# Patient Record
Sex: MALE | Race: WHITE | NOT HISPANIC OR LATINO | Employment: OTHER | URBAN - METROPOLITAN AREA
[De-identification: names, ages, dates, MRNs, and addresses within clinical notes are randomized per-mention and may not be internally consistent; named-entity substitution may affect disease eponyms.]

---

## 2017-12-13 ENCOUNTER — TRANSCRIBE ORDERS (OUTPATIENT)
Dept: ADMINISTRATIVE | Facility: HOSPITAL | Age: 72
End: 2017-12-13

## 2017-12-13 DIAGNOSIS — R06.02 SHORTNESS OF BREATH: Primary | ICD-10-CM

## 2017-12-14 ENCOUNTER — HOSPITAL ENCOUNTER (INPATIENT)
Facility: HOSPITAL | Age: 72
LOS: 5 days | Discharge: HOME | End: 2017-12-19
Attending: EMERGENCY MEDICINE | Admitting: INTERNAL MEDICINE

## 2017-12-14 ENCOUNTER — APPOINTMENT (OUTPATIENT)
Dept: CT IMAGING | Facility: HOSPITAL | Age: 72
End: 2017-12-14

## 2017-12-14 DIAGNOSIS — R11.2 NON-INTRACTABLE VOMITING WITH NAUSEA, UNSPECIFIED VOMITING TYPE: ICD-10-CM

## 2017-12-14 DIAGNOSIS — K56.609 SBO (SMALL BOWEL OBSTRUCTION) (HCC): Primary | ICD-10-CM

## 2017-12-14 DIAGNOSIS — R10.33 PERIUMBILICAL ABDOMINAL PAIN: ICD-10-CM

## 2017-12-14 PROBLEM — E87.6 HYPOKALEMIA: Status: ACTIVE | Noted: 2017-12-14

## 2017-12-14 PROBLEM — Z85.46 H/O PROSTATE CANCER: Status: ACTIVE | Noted: 2017-12-14

## 2017-12-14 PROBLEM — I10 HTN (HYPERTENSION): Status: ACTIVE | Noted: 2017-12-14

## 2017-12-14 LAB
ALBUMIN SERPL-MCNC: 4.1 G/DL (ref 3.2–4.8)
ALBUMIN/GLOB SERPL: 1.4 G/DL (ref 1.5–2.5)
ALP SERPL-CCNC: 81 U/L (ref 25–100)
ALT SERPL W P-5'-P-CCNC: 24 U/L (ref 7–40)
ANION GAP SERPL CALCULATED.3IONS-SCNC: 11 MMOL/L (ref 3–11)
AST SERPL-CCNC: 27 U/L (ref 0–33)
BACTERIA UR QL AUTO: ABNORMAL /HPF
BASOPHILS # BLD AUTO: 0.02 10*3/MM3 (ref 0–0.2)
BASOPHILS NFR BLD AUTO: 0.2 % (ref 0–1)
BILIRUB SERPL-MCNC: 1.3 MG/DL (ref 0.3–1.2)
BILIRUB UR QL STRIP: NEGATIVE
BUN BLD-MCNC: 19 MG/DL (ref 9–23)
BUN/CREAT SERPL: 23.8 (ref 7–25)
CALCIUM SPEC-SCNC: 9.1 MG/DL (ref 8.7–10.4)
CHLORIDE SERPL-SCNC: 102 MMOL/L (ref 99–109)
CLARITY UR: CLEAR
CO2 SERPL-SCNC: 25 MMOL/L (ref 20–31)
COLOR UR: YELLOW
CREAT BLD-MCNC: 0.8 MG/DL (ref 0.6–1.3)
DEPRECATED RDW RBC AUTO: 43.8 FL (ref 37–54)
EOSINOPHIL # BLD AUTO: 0.08 10*3/MM3 (ref 0–0.3)
EOSINOPHIL NFR BLD AUTO: 0.8 % (ref 0–3)
ERYTHROCYTE [DISTWIDTH] IN BLOOD BY AUTOMATED COUNT: 13.2 % (ref 11.3–14.5)
GFR SERPL CREATININE-BSD FRML MDRD: 95 ML/MIN/1.73
GLOBULIN UR ELPH-MCNC: 3 GM/DL
GLUCOSE BLD-MCNC: 126 MG/DL (ref 70–100)
GLUCOSE UR STRIP-MCNC: NEGATIVE MG/DL
HCT VFR BLD AUTO: 48.5 % (ref 38.9–50.9)
HGB BLD-MCNC: 16.4 G/DL (ref 13.1–17.5)
HGB UR QL STRIP.AUTO: ABNORMAL
HOLD SPECIMEN: NORMAL
HOLD SPECIMEN: NORMAL
HYALINE CASTS UR QL AUTO: ABNORMAL /LPF
IMM GRANULOCYTES # BLD: 0.01 10*3/MM3 (ref 0–0.03)
IMM GRANULOCYTES NFR BLD: 0.1 % (ref 0–0.6)
KETONES UR QL STRIP: ABNORMAL
LEUKOCYTE ESTERASE UR QL STRIP.AUTO: NEGATIVE
LIPASE SERPL-CCNC: 28 U/L (ref 6–51)
LYMPHOCYTES # BLD AUTO: 2.2 10*3/MM3 (ref 0.6–4.8)
LYMPHOCYTES NFR BLD AUTO: 21.6 % (ref 24–44)
MCH RBC QN AUTO: 31 PG (ref 27–31)
MCHC RBC AUTO-ENTMCNC: 33.8 G/DL (ref 32–36)
MCV RBC AUTO: 91.7 FL (ref 80–99)
MONOCYTES # BLD AUTO: 1.02 10*3/MM3 (ref 0–1)
MONOCYTES NFR BLD AUTO: 10 % (ref 0–12)
NEUTROPHILS # BLD AUTO: 6.84 10*3/MM3 (ref 1.5–8.3)
NEUTROPHILS NFR BLD AUTO: 67.3 % (ref 41–71)
NITRITE UR QL STRIP: NEGATIVE
PH UR STRIP.AUTO: 5.5 [PH] (ref 5–8)
PLATELET # BLD AUTO: 154 10*3/MM3 (ref 150–450)
PMV BLD AUTO: 10 FL (ref 6–12)
POTASSIUM BLD-SCNC: 3.3 MMOL/L (ref 3.5–5.5)
PROT SERPL-MCNC: 7.1 G/DL (ref 5.7–8.2)
PROT UR QL STRIP: NEGATIVE
RBC # BLD AUTO: 5.29 10*6/MM3 (ref 4.2–5.76)
RBC # UR: ABNORMAL /HPF
REF LAB TEST METHOD: ABNORMAL
SODIUM BLD-SCNC: 138 MMOL/L (ref 132–146)
SP GR UR STRIP: 1.05 (ref 1–1.03)
SQUAMOUS #/AREA URNS HPF: ABNORMAL /HPF
UROBILINOGEN UR QL STRIP: ABNORMAL
WBC NRBC COR # BLD: 10.17 10*3/MM3 (ref 3.5–10.8)
WBC UR QL AUTO: ABNORMAL /HPF
WHOLE BLOOD HOLD SPECIMEN: NORMAL
WHOLE BLOOD HOLD SPECIMEN: NORMAL

## 2017-12-14 PROCEDURE — 83690 ASSAY OF LIPASE: CPT | Performed by: EMERGENCY MEDICINE

## 2017-12-14 PROCEDURE — 0 IOPAMIDOL 61 % SOLUTION: Performed by: EMERGENCY MEDICINE

## 2017-12-14 PROCEDURE — 81001 URINALYSIS AUTO W/SCOPE: CPT | Performed by: EMERGENCY MEDICINE

## 2017-12-14 PROCEDURE — 25010000002 KETOROLAC TROMETHAMINE PER 15 MG: Performed by: EMERGENCY MEDICINE

## 2017-12-14 PROCEDURE — 99284 EMERGENCY DEPT VISIT MOD MDM: CPT

## 2017-12-14 PROCEDURE — 74177 CT ABD & PELVIS W/CONTRAST: CPT

## 2017-12-14 PROCEDURE — 25010000002 DICYCLOMINE PER 20 MG: Performed by: EMERGENCY MEDICINE

## 2017-12-14 PROCEDURE — 99223 1ST HOSP IP/OBS HIGH 75: CPT | Performed by: INTERNAL MEDICINE

## 2017-12-14 PROCEDURE — 25010000002 ONDANSETRON PER 1 MG: Performed by: EMERGENCY MEDICINE

## 2017-12-14 PROCEDURE — 85025 COMPLETE CBC W/AUTO DIFF WBC: CPT | Performed by: EMERGENCY MEDICINE

## 2017-12-14 PROCEDURE — 80053 COMPREHEN METABOLIC PANEL: CPT | Performed by: EMERGENCY MEDICINE

## 2017-12-14 RX ORDER — FAMOTIDINE 10 MG/ML
20 INJECTION, SOLUTION INTRAVENOUS ONCE
Status: COMPLETED | OUTPATIENT
Start: 2017-12-14 | End: 2017-12-14

## 2017-12-14 RX ORDER — ATENOLOL 100 MG/1
TABLET ORAL NIGHTLY
COMMUNITY

## 2017-12-14 RX ORDER — KETOROLAC TROMETHAMINE 15 MG/ML
7.5 INJECTION, SOLUTION INTRAMUSCULAR; INTRAVENOUS ONCE
Status: COMPLETED | OUTPATIENT
Start: 2017-12-14 | End: 2017-12-14

## 2017-12-14 RX ORDER — PANTOPRAZOLE SODIUM 20 MG/1
TABLET, DELAYED RELEASE ORAL DAILY
COMMUNITY

## 2017-12-14 RX ORDER — SODIUM CHLORIDE 9 MG/ML
125 INJECTION, SOLUTION INTRAVENOUS CONTINUOUS
Status: DISCONTINUED | OUTPATIENT
Start: 2017-12-14 | End: 2017-12-15

## 2017-12-14 RX ORDER — ONDANSETRON 2 MG/ML
4 INJECTION INTRAMUSCULAR; INTRAVENOUS ONCE
Status: COMPLETED | OUTPATIENT
Start: 2017-12-14 | End: 2017-12-14

## 2017-12-14 RX ORDER — SODIUM CHLORIDE 0.9 % (FLUSH) 0.9 %
10 SYRINGE (ML) INJECTION AS NEEDED
Status: DISCONTINUED | OUTPATIENT
Start: 2017-12-14 | End: 2017-12-15

## 2017-12-14 RX ORDER — BUPROPION HYDROCHLORIDE 75 MG/1
TABLET ORAL NIGHTLY
COMMUNITY

## 2017-12-14 RX ORDER — DICYCLOMINE HYDROCHLORIDE 10 MG/ML
20 INJECTION INTRAMUSCULAR ONCE
Status: COMPLETED | OUTPATIENT
Start: 2017-12-14 | End: 2017-12-14

## 2017-12-14 RX ORDER — HYDROCHLOROTHIAZIDE 12.5 MG/1
CAPSULE, GELATIN COATED ORAL DAILY
COMMUNITY
End: 2018-08-29 | Stop reason: DRUGHIGH

## 2017-12-14 RX ORDER — LISINOPRIL 10 MG/1
TABLET ORAL NIGHTLY
COMMUNITY

## 2017-12-14 RX ADMIN — SODIUM CHLORIDE 125 ML/HR: 9 INJECTION, SOLUTION INTRAVENOUS at 22:41

## 2017-12-14 RX ADMIN — IOPAMIDOL 99 ML: 612 INJECTION, SOLUTION INTRAVENOUS at 22:07

## 2017-12-14 RX ADMIN — FAMOTIDINE 20 MG: 10 INJECTION, SOLUTION INTRAVENOUS at 21:30

## 2017-12-14 RX ADMIN — ONDANSETRON 4 MG: 2 INJECTION INTRAMUSCULAR; INTRAVENOUS at 21:27

## 2017-12-14 RX ADMIN — KETOROLAC TROMETHAMINE 7.5 MG: 15 INJECTION, SOLUTION INTRAMUSCULAR; INTRAVENOUS at 21:36

## 2017-12-14 RX ADMIN — SODIUM CHLORIDE 1000 ML: 9 INJECTION, SOLUTION INTRAVENOUS at 21:27

## 2017-12-14 RX ADMIN — DICYCLOMINE HYDROCHLORIDE 20 MG: 20 INJECTION, SOLUTION INTRAMUSCULAR at 21:38

## 2017-12-15 LAB
ANION GAP SERPL CALCULATED.3IONS-SCNC: 13 MMOL/L (ref 3–11)
BUN BLD-MCNC: 19 MG/DL (ref 9–23)
BUN/CREAT SERPL: 23.8 (ref 7–25)
CALCIUM SPEC-SCNC: 8.1 MG/DL (ref 8.7–10.4)
CHLORIDE SERPL-SCNC: 106 MMOL/L (ref 99–109)
CO2 SERPL-SCNC: 21 MMOL/L (ref 20–31)
CREAT BLD-MCNC: 0.8 MG/DL (ref 0.6–1.3)
D-LACTATE SERPL-SCNC: 0.6 MMOL/L (ref 0.5–2)
GFR SERPL CREATININE-BSD FRML MDRD: 95 ML/MIN/1.73
GLUCOSE BLD-MCNC: 111 MG/DL (ref 70–100)
MAGNESIUM SERPL-MCNC: 1.9 MG/DL (ref 1.3–2.7)
POTASSIUM BLD-SCNC: 3.6 MMOL/L (ref 3.5–5.5)
SODIUM BLD-SCNC: 140 MMOL/L (ref 132–146)

## 2017-12-15 PROCEDURE — 25010000003 POTASSIUM CHLORIDE 10 MEQ/100ML SOLUTION: Performed by: INTERNAL MEDICINE

## 2017-12-15 PROCEDURE — 25010000002 LORAZEPAM PER 2 MG: Performed by: INTERNAL MEDICINE

## 2017-12-15 PROCEDURE — 25010000002 MORPHINE SULFATE (PF) 2 MG/ML SOLUTION: Performed by: INTERNAL MEDICINE

## 2017-12-15 PROCEDURE — 83605 ASSAY OF LACTIC ACID: CPT | Performed by: INTERNAL MEDICINE

## 2017-12-15 PROCEDURE — 80048 BASIC METABOLIC PNL TOTAL CA: CPT | Performed by: INTERNAL MEDICINE

## 2017-12-15 PROCEDURE — 99233 SBSQ HOSP IP/OBS HIGH 50: CPT | Performed by: HOSPITALIST

## 2017-12-15 PROCEDURE — 25010000002 ONDANSETRON PER 1 MG: Performed by: INTERNAL MEDICINE

## 2017-12-15 PROCEDURE — 83735 ASSAY OF MAGNESIUM: CPT | Performed by: INTERNAL MEDICINE

## 2017-12-15 RX ORDER — MORPHINE SULFATE 2 MG/ML
1 INJECTION, SOLUTION INTRAMUSCULAR; INTRAVENOUS EVERY 4 HOURS PRN
Status: DISCONTINUED | OUTPATIENT
Start: 2017-12-15 | End: 2017-12-19 | Stop reason: HOSPADM

## 2017-12-15 RX ORDER — NALOXONE HCL 0.4 MG/ML
0.4 VIAL (ML) INJECTION
Status: DISCONTINUED | OUTPATIENT
Start: 2017-12-15 | End: 2017-12-19 | Stop reason: HOSPADM

## 2017-12-15 RX ORDER — MAGNESIUM SULFATE HEPTAHYDRATE 40 MG/ML
2 INJECTION, SOLUTION INTRAVENOUS AS NEEDED
Status: DISCONTINUED | OUTPATIENT
Start: 2017-12-15 | End: 2017-12-19 | Stop reason: HOSPADM

## 2017-12-15 RX ORDER — HYDRALAZINE HYDROCHLORIDE 20 MG/ML
10 INJECTION INTRAMUSCULAR; INTRAVENOUS EVERY 6 HOURS PRN
Status: DISCONTINUED | OUTPATIENT
Start: 2017-12-15 | End: 2017-12-19 | Stop reason: HOSPADM

## 2017-12-15 RX ORDER — SODIUM CHLORIDE 9 MG/ML
150 INJECTION, SOLUTION INTRAVENOUS CONTINUOUS
Status: DISCONTINUED | OUTPATIENT
Start: 2017-12-15 | End: 2017-12-17

## 2017-12-15 RX ORDER — BUPROPION HYDROCHLORIDE 75 MG/1
75 TABLET ORAL NIGHTLY
Status: DISCONTINUED | OUTPATIENT
Start: 2017-12-15 | End: 2017-12-19 | Stop reason: HOSPADM

## 2017-12-15 RX ORDER — SODIUM CHLORIDE 0.9 % (FLUSH) 0.9 %
1-10 SYRINGE (ML) INJECTION AS NEEDED
Status: DISCONTINUED | OUTPATIENT
Start: 2017-12-15 | End: 2017-12-15

## 2017-12-15 RX ORDER — PANTOPRAZOLE SODIUM 40 MG/1
40 TABLET, DELAYED RELEASE ORAL
Status: DISCONTINUED | OUTPATIENT
Start: 2017-12-16 | End: 2017-12-19 | Stop reason: HOSPADM

## 2017-12-15 RX ORDER — ONDANSETRON 2 MG/ML
4 INJECTION INTRAMUSCULAR; INTRAVENOUS EVERY 6 HOURS PRN
Status: DISCONTINUED | OUTPATIENT
Start: 2017-12-15 | End: 2017-12-19 | Stop reason: HOSPADM

## 2017-12-15 RX ORDER — POTASSIUM CHLORIDE 7.45 MG/ML
10 INJECTION INTRAVENOUS
Status: DISCONTINUED | OUTPATIENT
Start: 2017-12-15 | End: 2017-12-19 | Stop reason: HOSPADM

## 2017-12-15 RX ORDER — OXYCODONE HYDROCHLORIDE 5 MG/1
5 TABLET ORAL EVERY 4 HOURS PRN
Status: DISCONTINUED | OUTPATIENT
Start: 2017-12-15 | End: 2017-12-19 | Stop reason: HOSPADM

## 2017-12-15 RX ORDER — POTASSIUM CHLORIDE 750 MG/1
40 CAPSULE, EXTENDED RELEASE ORAL AS NEEDED
Status: DISCONTINUED | OUTPATIENT
Start: 2017-12-15 | End: 2017-12-18 | Stop reason: SDUPTHER

## 2017-12-15 RX ORDER — MAGNESIUM SULFATE HEPTAHYDRATE 40 MG/ML
4 INJECTION, SOLUTION INTRAVENOUS AS NEEDED
Status: DISCONTINUED | OUTPATIENT
Start: 2017-12-15 | End: 2017-12-19 | Stop reason: HOSPADM

## 2017-12-15 RX ORDER — LORAZEPAM 2 MG/ML
0.5 INJECTION INTRAMUSCULAR ONCE
Status: COMPLETED | OUTPATIENT
Start: 2017-12-15 | End: 2017-12-15

## 2017-12-15 RX ADMIN — MORPHINE SULFATE 1 MG: 25 INJECTION, SOLUTION, CONCENTRATE INTRAVENOUS at 09:27

## 2017-12-15 RX ADMIN — LORAZEPAM 0.5 MG: 2 INJECTION INTRAMUSCULAR; INTRAVENOUS at 00:55

## 2017-12-15 RX ADMIN — BUPROPION HYDROCHLORIDE 75 MG: 75 TABLET, FILM COATED ORAL at 21:20

## 2017-12-15 RX ADMIN — SODIUM CHLORIDE 150 ML/HR: 9 INJECTION, SOLUTION INTRAVENOUS at 05:33

## 2017-12-15 RX ADMIN — POTASSIUM CHLORIDE 10 MEQ: 7.46 INJECTION, SOLUTION INTRAVENOUS at 04:06

## 2017-12-15 RX ADMIN — POTASSIUM CHLORIDE 10 MEQ: 7.46 INJECTION, SOLUTION INTRAVENOUS at 02:29

## 2017-12-15 RX ADMIN — SODIUM CHLORIDE 150 ML/HR: 9 INJECTION, SOLUTION INTRAVENOUS at 19:42

## 2017-12-15 RX ADMIN — ONDANSETRON 4 MG: 2 INJECTION INTRAMUSCULAR; INTRAVENOUS at 09:27

## 2017-12-15 RX ADMIN — POTASSIUM CHLORIDE 10 MEQ: 7.46 INJECTION, SOLUTION INTRAVENOUS at 00:27

## 2017-12-15 RX ADMIN — POTASSIUM CHLORIDE 10 MEQ: 7.46 INJECTION, SOLUTION INTRAVENOUS at 01:25

## 2017-12-15 NOTE — PROGRESS NOTES
Discharge Planning Assessment  [facility]     Patient Name: Hayden Farah  MRN: [id number]  Today's Date: 12/15/2017    Admit Date: 12/14/2017          Discharge Needs Assessment       12/15/17 1501    Living Environment    Lives With spouse    Living Arrangements house   3 level home    Home Accessibility bed and bath on same level    Number of Stairs to Enter Home 1    Number of Stairs Within Home 15    Stair Railings at Home inside, present on right side    Type of Financial/Environmental Concern none    Transportation Available family or friend will provide;car    Living Environment    Provides Primary Care For no one    Primary Care Provided By spouse/significant other    Quality Of Family Relationships supportive    Able to Return to Prior Living Arrangements yes    Discharge Needs Assessment    Concerns To Be Addressed no discharge needs identified    Readmission Within The Last 30 Days no previous admission in last 30 days    Anticipated Changes Related to Illness none    Equipment Currently Used at Home none    Equipment Needed After Discharge none    Discharge Disposition home or self-care    Discharge Contact Information if Applicable [phone]            Discharge Plan       12/15/17 1502    Case Management/Social Work Plan    Plan Home    Patient/Family In Agreement With Plan yes    Additional Comments I spoke with patient this afternoon. No current discharge planning needs identified.         Discharge Placement     No information found        Expected Discharge Date and Time     Expected Discharge Date Expected Discharge Time    Dec 17, 2017               Demographic Summary       12/15/17 1500    Referral Information    Admission Type inpatient    Referral Source admission list    Record Reviewed medical record    Contact Information    Permission Granted to Share Information With     Primary Care Physician Information    Name Bean Jaen            Functional Status        12/15/17 1500    Functional Status Current    Ambulation 2-->assistive person    Transferring 0-->independent    Toileting 0-->independent    Bathing 0-->independent    Dressing 0-->independent    Eating 0-->independent    Communication 0-->understands/communicates without difficulty    Change in Functional Status Since Onset of Current Illness/Injury no    Functional Status Prior    Ambulation 0-->independent    Transferring 0-->independent    Toileting 0-->independent    Bathing 0-->independent    Dressing 0-->independent    Eating 0-->independent    Communication 0-->understands/communicates without difficulty    IADL    Medications independent    Meal Preparation independent    Housekeeping independent    Laundry independent    Shopping independent    Oral Care independent    Activity Tolerance    Current Activity Limitations none    Usual Activity Tolerance excellent    Current Activity Tolerance excellent    Employment/Financial    Employment/Finance Comments Has Medicare and supplement            Psychosocial     None            Abuse/Neglect     None            Legal     None            Substance Abuse     None            Patient Forms     None          Charmaine Morales RN

## 2017-12-15 NOTE — PLAN OF CARE
Problem: Bowel Obstruction (Adult)  Goal: Signs and Symptoms of Listed Potential Problems Will be Absent or Manageable (Bowel Obstruction)  Outcome: Ongoing (interventions implemented as appropriate)

## 2017-12-15 NOTE — PROGRESS NOTES
[facility] Medicine Services  PROGRESS NOTE    Patient Name: Hayden Farah  : 1945  MRN: [id number]    Date of Admission: 2017  Length of Stay: 1  Primary Care Physician: Bean Jane MD    Subjective   Subjective     CC:  FU SBO    HPI:  Abdominal pain better. +Flatus and had a small BM. No appetite yet. Ambulating on his own.    Review of Systems  Gen- No fevers, chills  CV- No chest pain, palpitations  Resp- No cough, dyspnea  GI- No N/V/D, abd pain    Otherwise ROS is negative except as mentioned in the HPI.    Objective   Objective     Vital Signs:   Temp:  [97.9 °F (36.6 °C)-99.3 °F (37.4 °C)] 97.9 °F (36.6 °C)  Heart Rate:  [61-86] 86  Resp:  [16-18] 16  BP: (137-160)/(76-91) 138/76        Physical Exam:  Constitutional: No acute distress, awake, alert on RA  Eyes: PERRLA, sclerae anicteric, no conjunctival injection  HENT: NCAT, mucous membranes moist  Neck: Supple, no thyromegaly, no lymphadenopathy, trachea midline  Respiratory: Clear to auscultation bilaterally, nonlabored respirations   Cardiovascular: RRR, no murmurs, rubs, or gallops, palpable pedal pulses bilaterally  Gastrointestinal: Positive bowel sounds, soft, mild tenderness to palpation in RLQ and periumbilical regions, nondistended  Musculoskeletal: No bilateral ankle edema, no clubbing or cyanosis to extremities  Psychiatric: Appropriate affect, cooperative  Neurologic: Oriented x 3, strength symmetric in all extremities, Cranial Nerves grossly intact to confrontation, speech clear  Skin: No rashes    Results Reviewed:  I have personally reviewed current lab, radiology, and data and agree.      Results from last 7 days  Lab Units 17  2119   WBC 10*3/mm3 10.17   HEMOGLOBIN g/dL 16.4   HEMATOCRIT % 48.5   PLATELETS 10*3/mm3 154       Results from last 7 days  Lab Units 12/15/17  0308 17  2119   SODIUM mmol/L 140 138   POTASSIUM mmol/L 3.6 3.3*   CHLORIDE mmol/L 106 102   CO2  mmol/L 21.0 25.0   BUN mg/dL 19 19   CREATININE mg/dL 0.80 0.80   GLUCOSE mg/dL 111* 126*   CALCIUM mg/dL 8.1* 9.1   ALT (SGPT) U/L  --  24   AST (SGOT) U/L  --  27     No results found for: BNP  No results found for: PHART    Microbiology Results Abnormal     None          Imaging Results (last 24 hours)     Procedure Component Value Units Date/Time    CT Abdomen Pelvis With Contrast [[id number]] Collected:  12/14/17 2115     Updated:  12/14/17 2250    Narrative:       EXAM:  CT Abdomen and Pelvis With Intravenous Contrast    CLINICAL HISTORY:  72 years old, male; Pain; Abdominal pain; Localized; Right upper quadrant   (ruq); Prior surgery; Surgery date: 6+ months; Surgery type: Nicole; Additional   info: Abdominal pain with n/v and borderline fever    TECHNIQUE:  Axial computed tomography images of the abdomen and pelvis with intravenous   contrast.  All CT scans at this facility use one or more dose reduction   techniques, viz.: automated exposure control; ma/kV adjustment per patient size   (including targeted exams where dose is matched to indication; i.e. head); or   iterative reconstruction technique.  Coronal reformatted images were created and reviewed.    CONTRAST:  99 mL of isovue 300 administered intravenously.    COMPARISON:  CR - OX HIP AP PELVIS AN 1 LATERAL PNL 2014-05-01 17:28    FINDINGS:  Lower thorax:  Granuloma within the lingula.    ABDOMEN:  Liver:  Normal.  Gallbladder and bile ducts:  Gallbladder is surgically absent.  Pancreas:  Normal.  Spleen:  Splenic calcifications, compatible with prior granulomatous disease.    Splenomegaly.  Adrenals:  Normal.  Kidneys and ureters:  Bilateral simple renal cysts.  Stomach and bowel:  Multiple loops of mildly dilated, gas and fluid-filled mid   and distal small bowel, with apparent transition to normal caliber small bowel   within the right lower quadrant.  Colonic diverticulosis.  Appendix:  The appendix is normal.    PELVIS:  Bladder:   Normal.  Reproductive:  Normal as visualized.    ABDOMEN and PELVIS:  Intraperitoneal space:  Normal.  No free air.  No significant fluid collection.  Bones/joints:  Degenerative changes of the hips and sacroiliac joints.    Multilevel thoracolumbar spine degenerative changes.  No acute fracture.  No   dislocation.  Soft tissues:  Small fat-containing umbilical hernia.  Small fat-containing   left inguinal hernia, without acute complications.  Vasculature:  Normal.  No abdominal aortic aneurysm.  Lymph nodes:  Normal.      Impression:         1.  Multiple loops of mildly dilated, gas and fluid-filled mid and distal small   bowel, with apparent transition to normal caliber small bowel within the right   lower quadrant.  Findings most compatible with small bowel obstruction.    2.  Incidental/non-acute findings are described above.    THIS DOCUMENT HAS BEEN ELECTRONICALLY SIGNED BY NIKKY TAN MD             I have reviewed the medications.    Assessment/Plan   Assessment / Plan     Hospital Problem List     Hypokalemia    SBO (small bowel obstruction)    HTN (hypertension)    H/O prostate cancer        Assessment & Plan:  - SBO: CT A/P reviewed. Appears to be improving with +flatus and BM. NGT to clamp. PO meds okay. Pain control with Oxycodone PRN and IV Morphine PRN.  - HypoK+: Replete. Mag wnl  - HTN: Holding home atenolol and lisinopril for normotension. Restart when appropriate    DVT Prophylaxis:  Mechanical, ambulation    CODE STATUS: Full Code    Disposition: I expect the patient to be discharged home in 1-2 days  Tanika Moreno MD  12/15/17  7:15 AM

## 2017-12-15 NOTE — H&P
"    [facility] Medicine Services  HISTORY AND PHYSICAL    Patient Name: Hayden Farah  : 1945  MRN: [id number]  Primary Care Physician: Bean Jane MD    Subjective   Subjective     Chief Complaint:  n/v    HPI:  Hayden Farah is a 72 y.o. male who presents at the insistence of several family members after 3 day hx of rlq abd pain a/w n/v/anorexia and 1 day of fevers 100+.  Pt states he has not had a normal bm in few days and is now having \"diarrhea\" once a day.  Pt has had very little po intake secondary to all the above.  No prior hx of sbo.  Has colonoscopies q5yrs and is currently due (has hx of polyps).  Has had ccy/prostatectomy.    Review of Systems      Otherwise 10-system ROS reviewed and is negative except as mentioned in the HPI.    Personal History     Past Medical History:   Diagnosis Date   • Cancer     prostate cancer   • Celiac disease    • Headache    • Hypertension    • Pneumonia     Pt reports he was 32, in hospital for weeks.        Past Surgical History:   Procedure Laterality Date   • CHOLECYSTECTOMY     • PROSTATECTOMY     • SHOULDER SURGERY Left 20 years ago       Family History: family history is not on file.     Social History:  reports that he has never smoked. He has never used smokeless tobacco. He reports that he does not drink alcohol or use illicit drugs.    Medications:    (Not in a hospital admission)    Allergies   Allergen Reactions   • Sulfa Antibiotics        Objective   Objective     Vital Signs:   Temp:  [99.3 °F (37.4 °C)] 99.3 °F (37.4 °C)  Heart Rate:  [61] 61  Resp:  [18] 18  BP: (137-144)/(76-79) 144/76        Physical Exam    gen; alert, oriented, nad  Heent; perrla, eomi, pasty mm  Cv; rrr, no mrg  L; ctab, no wheeze/crackles  Abd; soft, ttp diffusely but greatest in llq,rlq, no r; sl guarding  Ext; no cce, 2+ pulses  Skin; cdi, warm  Neuro; grossly intact  Psych; mood and affect appropriate    Results Reviewed:  I have " personally reviewed current lab, radiology, and data and agree.      Results from last 7 days  Lab Units 12/14/17 2119   WBC 10*3/mm3 10.17   HEMOGLOBIN g/dL 16.4   HEMATOCRIT % 48.5   PLATELETS 10*3/mm3 154       Results from last 7 days  Lab Units 12/14/17 2119   SODIUM mmol/L 138   POTASSIUM mmol/L 3.3*   CHLORIDE mmol/L 102   CO2 mmol/L 25.0   BUN mg/dL 19   CREATININE mg/dL 0.80   GLUCOSE mg/dL 126*   CALCIUM mg/dL 9.1   ALT (SGPT) U/L 24   AST (SGOT) U/L 27     No results found for: BNP  No results found for: PHART  Imaging Results (last 24 hours)     Procedure Component Value Units Date/Time    CT Abdomen Pelvis With Contrast [[id number]] Collected:  12/14/17 2115     Updated:  12/14/17 2250    Narrative:       EXAM:  CT Abdomen and Pelvis With Intravenous Contrast    CLINICAL HISTORY:  72 years old, male; Pain; Abdominal pain; Localized; Right upper quadrant   (ruq); Prior surgery; Surgery date: 6+ months; Surgery type: Nicole; Additional   info: Abdominal pain with n/v and borderline fever    TECHNIQUE:  Axial computed tomography images of the abdomen and pelvis with intravenous   contrast.  All CT scans at this facility use one or more dose reduction   techniques, viz.: automated exposure control; ma/kV adjustment per patient size   (including targeted exams where dose is matched to indication; i.e. head); or   iterative reconstruction technique.  Coronal reformatted images were created and reviewed.    CONTRAST:  99 mL of isovue 300 administered intravenously.    COMPARISON:  CR - OX HIP AP PELVIS AN 1 LATERAL PNL 2014-05-01 17:28    FINDINGS:  Lower thorax:  Granuloma within the lingula.    ABDOMEN:  Liver:  Normal.  Gallbladder and bile ducts:  Gallbladder is surgically absent.  Pancreas:  Normal.  Spleen:  Splenic calcifications, compatible with prior granulomatous disease.    Splenomegaly.  Adrenals:  Normal.  Kidneys and ureters:  Bilateral simple renal cysts.  Stomach and bowel:  Multiple loops of  mildly dilated, gas and fluid-filled mid   and distal small bowel, with apparent transition to normal caliber small bowel   within the right lower quadrant.  Colonic diverticulosis.  Appendix:  The appendix is normal.    PELVIS:  Bladder:  Normal.  Reproductive:  Normal as visualized.    ABDOMEN and PELVIS:  Intraperitoneal space:  Normal.  No free air.  No significant fluid collection.  Bones/joints:  Degenerative changes of the hips and sacroiliac joints.    Multilevel thoracolumbar spine degenerative changes.  No acute fracture.  No   dislocation.  Soft tissues:  Small fat-containing umbilical hernia.  Small fat-containing   left inguinal hernia, without acute complications.  Vasculature:  Normal.  No abdominal aortic aneurysm.  Lymph nodes:  Normal.      Impression:         1.  Multiple loops of mildly dilated, gas and fluid-filled mid and distal small   bowel, with apparent transition to normal caliber small bowel within the right   lower quadrant.  Findings most compatible with small bowel obstruction.    2.  Incidental/non-acute findings are described above.    THIS DOCUMENT HAS BEEN ELECTRONICALLY SIGNED BY NIKKY TAN MD             Assessment/Plan   Assessment / Plan     Hospital Problem List     Hypokalemia    SBO (small bowel obstruction)    HTN (hypertension)    H/O prostate cancer            Assessment & Plan:  71 y/o fairly healthy male here w/  1. SBO; no prior hx of; due for colonoscopy.  Place ng, ivf's, antiemetics, pain control +/- surgical consult.   2. Hypokalemia; replace; check mag; likely related to above.  3. Htn; hold home meds and provide prn iv hydralazine until ng removed/sbo resolved.  4. H/o prostate ca; s/p prostatectomy.    DVT prophylaxis:scd/teds only for now    CODE STATUS:  No Order    Admission Status:  I believe this patient meets INPATIENT status due to the need for care which can only be reasonably provided in an hospital setting such as aggressive/expedited ancillary  services and/or consultation services, the necessity for IV medications, close physician monitoring and/or the possible need for procedures.  In such, I feel patient’s risk for adverse outcomes and need for care warrant INPATIENT evaluation and predict the patient’s care encounter to likely last beyond 2 midnights.    Maryellen Mejia MD   12/14/17   11:54 PM

## 2017-12-15 NOTE — ED PROVIDER NOTES
Subjective   HPI Comments: Mr. Hayden Farah is a 72 year old male with a hx of cholecystectomy and cancer who presents to the ED with c/o abd pain. 3 days ago, around 0200, the patient reports that he was awoken from his sleep to acute nausea and abd pain. The patient reports that the abd pain has been constant since, and is centralized in his RUQ and radiates through his back. Since onset, the patient reports that he has had several, intermittent episodes of diarrhea and vomiting. The patient states that he has been unable to eat much of anything since developing this pain. The patient denies any constipation or blood in his stool. The patient denies any other acute sx at this time.    Patient is a 72 y.o. male presenting with abdominal pain.   History provided by:  Patient  Abdominal Pain   Pain location:  RUQ  Pain radiates to:  Back  Pain severity:  Moderate  Onset quality:  Sudden  Duration:  3 days  Timing:  Constant  Progression:  Unchanged  Chronicity:  New  Context: awakening from sleep    Relieved by:  Nothing  Worsened by:  Nothing  Ineffective treatments:  Bowel activity and vomiting  Associated symptoms: anorexia, diarrhea, nausea and vomiting    Associated symptoms: no constipation        Review of Systems   Gastrointestinal: Positive for abdominal pain, anorexia, diarrhea, nausea and vomiting. Negative for blood in stool and constipation.   All other systems reviewed and are negative.      Past Medical History:   Diagnosis Date   • Cancer     prostate cancer   • Celiac disease    • Headache    • Hypertension    • Pneumonia     Pt reports he was 32, in hospital for weeks.        Allergies   Allergen Reactions   • Sulfa Antibiotics        Past Surgical History:   Procedure Laterality Date   • CHOLECYSTECTOMY     • PROSTATECTOMY     • SHOULDER SURGERY Left 20 years ago       History reviewed. No pertinent family history.    Social History     Social History   • Marital status:      Spouse  name: N/A   • Number of children: N/A   • Years of education: N/A     Social History Main Topics   • Smoking status: Never Smoker   • Smokeless tobacco: Never Used   • Alcohol use No   • Drug use: No   • Sexual activity: Not Asked     Other Topics Concern   • None     Social History Narrative   • None         Objective   Physical Exam   Constitutional: He is oriented to person, place, and time. He appears well-developed and well-nourished. No distress.   HENT:   Head: Normocephalic and atraumatic.   Eyes: Conjunctivae are normal. No scleral icterus.   Neck: Normal range of motion. Neck supple.   Cardiovascular: Normal rate, regular rhythm, normal heart sounds and intact distal pulses.  Exam reveals no gallop and no friction rub.    No murmur heard.  Pulmonary/Chest: Effort normal and breath sounds normal. No respiratory distress. He has no wheezes. He has no rales.   Abdominal: Soft. Bowel sounds are normal. There is tenderness in the right upper quadrant and epigastric area. There is no rigidity, no rebound and no guarding.   Moderate TTP in the RUQ and epigastric region. No guarding. No rigidity.   Musculoskeletal: Normal range of motion.   Neurological: He is alert and oriented to person, place, and time.   Skin: Skin is warm and dry. He is not diaphoretic.   Psychiatric: He has a normal mood and affect. His behavior is normal.   Nursing note and vitals reviewed.      Procedures         ED Course  ED Course   Value Comment By Time   Temp: 99.3 °F (37.4 °C) (Reviewed) Jun Lanier MD 12/14 2114    Case discussed with Dr. Mejia, on-call hospitalist, who will admit. -ALEX Clifton 12/14 2302       Recent Results (from the past 24 hour(s))   Comprehensive Metabolic Panel    Collection Time: 12/14/17  9:19 PM   Result Value Ref Range    Glucose 126 (H) 70 - 100 mg/dL    BUN 19 9 - 23 mg/dL    Creatinine 0.80 0.60 - 1.30 mg/dL    Sodium 138 132 - 146 mmol/L    Potassium 3.3 (L) 3.5 - 5.5 mmol/L    Chloride 102  99 - 109 mmol/L    CO2 25.0 20.0 - 31.0 mmol/L    Calcium 9.1 8.7 - 10.4 mg/dL    Total Protein 7.1 5.7 - 8.2 g/dL    Albumin 4.10 3.20 - 4.80 g/dL    ALT (SGPT) 24 7 - 40 U/L    AST (SGOT) 27 0 - 33 U/L    Alkaline Phosphatase 81 25 - 100 U/L    Total Bilirubin 1.3 (H) 0.3 - 1.2 mg/dL    eGFR Non African Amer 95 >60 mL/min/1.73    Globulin 3.0 gm/dL    A/G Ratio 1.4 (L) 1.5 - 2.5 g/dL    BUN/Creatinine Ratio 23.8 7.0 - 25.0    Anion Gap 11.0 3.0 - 11.0 mmol/L   Lipase    Collection Time: 12/14/17  9:19 PM   Result Value Ref Range    Lipase 28 6 - 51 U/L   Light Blue Top    Collection Time: 12/14/17  9:19 PM   Result Value Ref Range    Extra Tube hold for add-on    Green Top (Gel)    Collection Time: 12/14/17  9:19 PM   Result Value Ref Range    Extra Tube Hold for add-ons.    Lavender Top    Collection Time: 12/14/17  9:19 PM   Result Value Ref Range    Extra Tube hold for add-on    Gold Top - SST    Collection Time: 12/14/17  9:19 PM   Result Value Ref Range    Extra Tube Hold for add-ons.    CBC Auto Differential    Collection Time: 12/14/17  9:19 PM   Result Value Ref Range    WBC 10.17 3.50 - 10.80 10*3/mm3    RBC 5.29 4.20 - 5.76 10*6/mm3    Hemoglobin 16.4 13.1 - 17.5 g/dL    Hematocrit 48.5 38.9 - 50.9 %    MCV 91.7 80.0 - 99.0 fL    MCH 31.0 27.0 - 31.0 pg    MCHC 33.8 32.0 - 36.0 g/dL    RDW 13.2 11.3 - 14.5 %    RDW-SD 43.8 37.0 - 54.0 fl    MPV 10.0 6.0 - 12.0 fL    Platelets 154 150 - 450 10*3/mm3    Neutrophil % 67.3 41.0 - 71.0 %    Lymphocyte % 21.6 (L) 24.0 - 44.0 %    Monocyte % 10.0 0.0 - 12.0 %    Eosinophil % 0.8 0.0 - 3.0 %    Basophil % 0.2 0.0 - 1.0 %    Immature Grans % 0.1 0.0 - 0.6 %    Neutrophils, Absolute 6.84 1.50 - 8.30 10*3/mm3    Lymphocytes, Absolute 2.20 0.60 - 4.80 10*3/mm3    Monocytes, Absolute 1.02 (H) 0.00 - 1.00 10*3/mm3    Eosinophils, Absolute 0.08 0.00 - 0.30 10*3/mm3    Basophils, Absolute 0.02 0.00 - 0.20 10*3/mm3    Immature Grans, Absolute 0.01 0.00 - 0.03 10*3/mm3    Urinalysis With / Culture If Indicated - Urine, Clean Catch    Collection Time: 12/14/17 10:58 PM   Result Value Ref Range    Color, UA Yellow Yellow, Straw    Appearance, UA Clear Clear    pH, UA 5.5 5.0 - 8.0    Specific Gravity, UA 1.053 (H) 1.001 - 1.030    Glucose, UA Negative Negative    Ketones, UA Trace (A) Negative    Bilirubin, UA Negative Negative    Blood, UA Moderate (2+) (A) Negative    Protein, UA Negative Negative    Leuk Esterase, UA Negative Negative    Nitrite, UA Negative Negative    Urobilinogen, UA 1.0 E.U./dL 0.2 - 1.0 E.U./dL   Urinalysis, Microscopic Only - Urine, Clean Catch    Collection Time: 12/14/17 10:58 PM   Result Value Ref Range    RBC, UA 3-6 (A) None Seen, 0-2 /HPF    WBC, UA 0-2 (A) None Seen /HPF    Bacteria, UA None Seen None Seen, Trace /HPF    Squamous Epithelial Cells, UA None Seen None Seen, 0-2 /HPF    Hyaline Casts, UA None Seen 0 - 6 /LPF    Methodology Automated Microscopy      Note: In addition to lab results from this visit, the labs listed above may include labs taken at another facility or during a different encounter within the last 24 hours. Please correlate lab times with ED admission and discharge times for further clarification of the services performed during this visit.    CT Abdomen Pelvis With Contrast   Final Result      1.  Multiple loops of mildly dilated, gas and fluid-filled mid and distal small    bowel, with apparent transition to normal caliber small bowel within the right    lower quadrant.  Findings most compatible with small bowel obstruction.      2.  Incidental/non-acute findings are described above.      THIS DOCUMENT HAS BEEN ELECTRONICALLY SIGNED BY NIKKY TAN MD        Vitals:    12/14/17 2104 12/14/17 2243 12/14/17 2314   BP: 144/76 137/79 144/76   BP Location: Left arm     Patient Position: Sitting     Pulse: 61     Resp: 18     Temp: 99.3 °F (37.4 °C)     TempSrc: Oral     SpO2: 94%  96%   Weight: 79.4 kg (175 lb)     Height: 167.6  "cm (66\")       Medications   sodium chloride 0.9 % flush 10 mL (not administered)   sodium chloride 0.9 % infusion (125 mL/hr Intravenous New Bag 12/14/17 2241)   sodium chloride 0.9 % flush 1-10 mL (not administered)   sodium chloride 0.9 % infusion (150 mL/hr Intravenous Currently Infusing 12/15/17 0037)   Morphine sulfate (PF) injection 1 mg (not administered)     And   naloxone (NARCAN) injection 0.4 mg (not administered)   ondansetron (ZOFRAN) injection 4 mg (not administered)   potassium chloride 10 mEq in 100 mL IVPB (10 mEq Intravenous New Bag 12/15/17 0027)   hydrALAZINE (APRESOLINE) injection 10 mg (not administered)   sodium chloride 0.9 % bolus 1,000 mL (0 mL Intravenous Stopped 12/14/17 2241)   famotidine (PEPCID) injection 20 mg (20 mg Intravenous Given 12/14/17 2130)   ondansetron (ZOFRAN) injection 4 mg (4 mg Intravenous Given 12/14/17 2127)   ketorolac (TORADOL) injection 7.5 mg (7.5 mg Intravenous Given 12/14/17 2136)   dicyclomine (BENTYL) injection 20 mg (20 mg Intramuscular Given 12/14/17 2138)   iopamidol (ISOVUE-300) 61 % injection 100 mL (99 mL Intravenous Given 12/14/17 2207)   LORazepam (ATIVAN) injection 0.5 mg (0.5 mg Intravenous Given 12/15/17 0055)     ECG/EMG Results (last 24 hours)     ** No results found for the last 24 hours. **                      MDM  Number of Diagnoses or Management Options     Amount and/or Complexity of Data Reviewed  Clinical lab tests: reviewed  Tests in the radiology section of CPT®: reviewed  Discuss the patient with other providers: yes  Independent visualization of images, tracings, or specimens: yes        Final diagnoses:   SBO (small bowel obstruction)   Periumbilical abdominal pain   Non-intractable vomiting with nausea, unspecified vomiting type       Documentation assistance provided by truman Clifton.  Information recorded by the scribe was done at my direction and has been verified and validated by me.     Tom Clifton  12/14/17 2135     "   Jun Lanier MD  12/15/17 0057

## 2017-12-15 NOTE — PLAN OF CARE
Problem: Patient Care Overview (Adult)  Goal: Plan of Care Review  Outcome: Ongoing (interventions implemented as appropriate)    12/15/17 1720   Patient Care Overview   Progress improving   Outcome Evaluation   Outcome Summary/Follow up Plan NG clamped for most of shift, no c/o nausea, advanced to ice chips, pain well controlled.    Coping/Psychosocial Response Interventions   Plan Of Care Reviewed With patient       Goal: Adult Individualization and Mutuality  Outcome: Ongoing (interventions implemented as appropriate)  Goal: Discharge Needs Assessment  Outcome: Ongoing (interventions implemented as appropriate)    Problem: Bowel Obstruction (Adult)  Goal: Signs and Symptoms of Listed Potential Problems Will be Absent or Manageable (Bowel Obstruction)  Outcome: Ongoing (interventions implemented as appropriate)

## 2017-12-15 NOTE — PLAN OF CARE
Problem: Patient Care Overview (Adult)  Goal: Plan of Care Review  Outcome: Ongoing (interventions implemented as appropriate)    12/15/17 0015 12/15/17 0357   Patient Care Overview   Progress --  no change   Outcome Evaluation   Outcome Summary/Follow up Plan --  NG LWS. IV K+ RUNS X 4. ELECTROLYTES TO BE CHECKED THIS AM. SOME ANXIETY BUT BETTER WITH ATIVAN   Coping/Psychosocial Response Interventions   Plan Of Care Reviewed With patient;spouse --        Goal: Discharge Needs Assessment  Outcome: Ongoing (interventions implemented as appropriate)    Problem: Bowel Obstruction (Adult)  Goal: Signs and Symptoms of Listed Potential Problems Will be Absent or Manageable (Bowel Obstruction)  Outcome: Ongoing (interventions implemented as appropriate)

## 2017-12-16 LAB
ALBUMIN SERPL-MCNC: 3.4 G/DL (ref 3.2–4.8)
ALBUMIN/GLOB SERPL: 1.5 G/DL (ref 1.5–2.5)
ALP SERPL-CCNC: 62 U/L (ref 25–100)
ALT SERPL W P-5'-P-CCNC: 17 U/L (ref 7–40)
ANION GAP SERPL CALCULATED.3IONS-SCNC: 10 MMOL/L (ref 3–11)
AST SERPL-CCNC: 23 U/L (ref 0–33)
BILIRUB SERPL-MCNC: 1.6 MG/DL (ref 0.3–1.2)
BUN BLD-MCNC: 12 MG/DL (ref 9–23)
BUN/CREAT SERPL: 17.1 (ref 7–25)
CALCIUM SPEC-SCNC: 7.7 MG/DL (ref 8.7–10.4)
CHLORIDE SERPL-SCNC: 108 MMOL/L (ref 99–109)
CO2 SERPL-SCNC: 21 MMOL/L (ref 20–31)
CREAT BLD-MCNC: 0.7 MG/DL (ref 0.6–1.3)
DEPRECATED RDW RBC AUTO: 44.1 FL (ref 37–54)
ERYTHROCYTE [DISTWIDTH] IN BLOOD BY AUTOMATED COUNT: 13 % (ref 11.3–14.5)
GFR SERPL CREATININE-BSD FRML MDRD: 111 ML/MIN/1.73
GLOBULIN UR ELPH-MCNC: 2.3 GM/DL
GLUCOSE BLD-MCNC: 75 MG/DL (ref 70–100)
HCT VFR BLD AUTO: 41.3 % (ref 38.9–50.9)
HGB BLD-MCNC: 13.6 G/DL (ref 13.1–17.5)
MAGNESIUM SERPL-MCNC: 1.9 MG/DL (ref 1.3–2.7)
MCH RBC QN AUTO: 30.7 PG (ref 27–31)
MCHC RBC AUTO-ENTMCNC: 32.9 G/DL (ref 32–36)
MCV RBC AUTO: 93.2 FL (ref 80–99)
PHOSPHATE SERPL-MCNC: 2.3 MG/DL (ref 2.4–5.1)
PLATELET # BLD AUTO: 129 10*3/MM3 (ref 150–450)
PMV BLD AUTO: 10 FL (ref 6–12)
POTASSIUM BLD-SCNC: 3.3 MMOL/L (ref 3.5–5.5)
POTASSIUM BLD-SCNC: 3.8 MMOL/L (ref 3.5–5.5)
PROT SERPL-MCNC: 5.7 G/DL (ref 5.7–8.2)
RBC # BLD AUTO: 4.43 10*6/MM3 (ref 4.2–5.76)
SODIUM BLD-SCNC: 139 MMOL/L (ref 132–146)
WBC NRBC COR # BLD: 8.1 10*3/MM3 (ref 3.5–10.8)

## 2017-12-16 PROCEDURE — 80053 COMPREHEN METABOLIC PANEL: CPT

## 2017-12-16 PROCEDURE — 25010000003 POTASSIUM CHLORIDE 10 MEQ/100ML SOLUTION: Performed by: INTERNAL MEDICINE

## 2017-12-16 PROCEDURE — 99233 SBSQ HOSP IP/OBS HIGH 50: CPT | Performed by: NURSE PRACTITIONER

## 2017-12-16 PROCEDURE — 85027 COMPLETE CBC AUTOMATED: CPT | Performed by: HOSPITALIST

## 2017-12-16 PROCEDURE — 84100 ASSAY OF PHOSPHORUS: CPT | Performed by: HOSPITALIST

## 2017-12-16 PROCEDURE — 83735 ASSAY OF MAGNESIUM: CPT

## 2017-12-16 PROCEDURE — 84132 ASSAY OF SERUM POTASSIUM: CPT | Performed by: HOSPITALIST

## 2017-12-16 RX ORDER — DOCUSATE SODIUM 100 MG/1
100 CAPSULE, LIQUID FILLED ORAL 2 TIMES DAILY
Status: DISCONTINUED | OUTPATIENT
Start: 2017-12-16 | End: 2017-12-18

## 2017-12-16 RX ADMIN — PANTOPRAZOLE SODIUM 40 MG: 40 TABLET, DELAYED RELEASE ORAL at 06:24

## 2017-12-16 RX ADMIN — SODIUM CHLORIDE 150 ML/HR: 9 INJECTION, SOLUTION INTRAVENOUS at 09:16

## 2017-12-16 RX ADMIN — DOCUSATE SODIUM 100 MG: 100 CAPSULE, LIQUID FILLED ORAL at 17:19

## 2017-12-16 RX ADMIN — POTASSIUM CHLORIDE 10 MEQ: 7.46 INJECTION, SOLUTION INTRAVENOUS at 11:30

## 2017-12-16 RX ADMIN — OXYCODONE HYDROCHLORIDE 5 MG: 5 TABLET ORAL at 21:29

## 2017-12-16 RX ADMIN — BUPROPION HYDROCHLORIDE 75 MG: 75 TABLET, FILM COATED ORAL at 21:29

## 2017-12-16 RX ADMIN — POTASSIUM CHLORIDE 10 MEQ: 7.46 INJECTION, SOLUTION INTRAVENOUS at 13:20

## 2017-12-16 RX ADMIN — POTASSIUM CHLORIDE 10 MEQ: 7.46 INJECTION, SOLUTION INTRAVENOUS at 10:02

## 2017-12-16 RX ADMIN — SODIUM CHLORIDE 150 ML/HR: 9 INJECTION, SOLUTION INTRAVENOUS at 23:55

## 2017-12-16 RX ADMIN — POTASSIUM CHLORIDE 10 MEQ: 7.46 INJECTION, SOLUTION INTRAVENOUS at 15:00

## 2017-12-16 RX ADMIN — OXYCODONE HYDROCHLORIDE 5 MG: 5 TABLET ORAL at 03:44

## 2017-12-16 RX ADMIN — SODIUM CHLORIDE 150 ML/HR: 9 INJECTION, SOLUTION INTRAVENOUS at 17:57

## 2017-12-16 RX ADMIN — SODIUM CHLORIDE 150 ML/HR: 9 INJECTION, SOLUTION INTRAVENOUS at 03:19

## 2017-12-16 NOTE — PLAN OF CARE
Problem: Patient Care Overview (Adult)  Goal: Plan of Care Review  Outcome: Ongoing (interventions implemented as appropriate)    12/15/17 1720 12/15/17 2000   Patient Care Overview   Progress improving --    Outcome Evaluation   Outcome Summary/Follow up Plan NG clamped for most of shift, no c/o nausea, advanced to ice chips, pain well controlled. o further bm or flatus this shift --    Coping/Psychosocial Response Interventions   Plan Of Care Reviewed With --  patient       Goal: Discharge Needs Assessment  Outcome: Ongoing (interventions implemented as appropriate)    Problem: Bowel Obstruction (Adult)  Goal: Signs and Symptoms of Listed Potential Problems Will be Absent or Manageable (Bowel Obstruction)  Outcome: Ongoing (interventions implemented as appropriate)

## 2017-12-16 NOTE — PROGRESS NOTES
[facility]  PROGRESS NOTE    Patient Name: Hayden Farah  : 1945  MRN: [id number]    Date of Admission: 2017  Length of Stay: 2  Primary Care Physician: Bean Jane MD    Subjective   Subjective     CC:  Hospital follow up for SBO    HPI:  Resting comfortably in bed this morning.  Has had 3 BMs since he got up around 0600 this am.  No nausea or abdominal pain overnight.  NG was clamped for most of the night.  Has been up walking in the hallway frequently.       Review of Systems  Gen- No fevers, chills  CV- No chest pain, palpitations  Resp- No cough, dyspnea  GI- No nausea or vomiting, mild abd tenderness but no pain, mild diarrhea    Otherwise ROS is negative except as mentioned in the HPI.    Objective   Objective     Vital Signs:   Temp:  [98.2 °F (36.8 °C)-98.9 °F (37.2 °C)] 98.4 °F (36.9 °C)  Heart Rate:  [68-91] 85  Resp:  [16-18] 16  BP: (146-166)/(70-80) 151/70        Physical Exam:  Constitutional: No acute distress, awake, alert, resting comfortably in bed  Eyes: PERRLA, sclerae anicteric, no conjunctival injection  HENT: NCAT, mucous membranes moist, NGT clamped  Neck: Supple, no thyromegaly, no lymphadenopathy, trachea midline  Respiratory: Clear to auscultation bilaterally, nonlabored respirations on room air   Cardiovascular: RRR, no murmurs, rubs, or gallops, palpable pedal pulses bilaterally  Gastrointestinal: Positive bowel sounds, soft, mild tenderness to palpation in RLQ and periumbilical regions, nondistended  Musculoskeletal: No bilateral ankle edema, no clubbing or cyanosis to extremities  Psychiatric: Appropriate affect, cooperative  Neurologic: Oriented x 3, strength symmetric in all extremities, speech clear  Skin: No rashes    Results Reviewed:  I have personally reviewed current lab, radiology, and data and agree.      Results from last 7 days  Lab Units 17  0556 17  2119   WBC 10*3/mm3 8.10 10.17    HEMOGLOBIN g/dL 13.6 16.4   HEMATOCRIT % 41.3 48.5   PLATELETS 10*3/mm3 129* 154       Results from last 7 days  Lab Units 12/16/17  0556 12/15/17  0308 12/14/17  2119   SODIUM mmol/L 139 140 138   POTASSIUM mmol/L 3.3* 3.6 3.3*   CHLORIDE mmol/L 108 106 102   CO2 mmol/L 21.0 21.0 25.0   BUN mg/dL 12 19 19   CREATININE mg/dL 0.70 0.80 0.80   GLUCOSE mg/dL 75 111* 126*   CALCIUM mg/dL 7.7* 8.1* 9.1   ALT (SGPT) U/L 17  --  24   AST (SGOT) U/L 23  --  27     No results found for: BNP  No results found for: PHART    Microbiology Results Abnormal     None          Imaging Results (last 24 hours)     ** No results found for the last 24 hours. **             I have reviewed the medications.    Assessment/Plan   Assessment / Plan     Hospital Problem List     Hypokalemia    SBO (small bowel obstruction)    HTN (hypertension)    H/O prostate cancer        Assessment & Plan:  - SBO:  -- Improving.  He was clamped for most of the night.  Pain is mild, and he hasn't required any pain meds since the middle of the night.  No nausea.    --Pain control with Oxycodone PRN and IV Morphine PRN.  --Will keep NG clamped and advance diet to clears.  Will check back this afternoon to see how he did.    --Bowel regimen, as he has been having some issues with constipation, even at home.  Colace for now.  May need to add additional agent.       - HypoK+:   --3.3 today.  Replacing per protocol.      - HTN:   --Held home atenolol and lisinopril on admission for normotension.   --Will go ahead and restart lisinopril today and monitor, as his bps are beginning to elevate.      DVT Prophylaxis:  Mechanical, ambulation    CODE STATUS: Full Code    Disposition: I expect the patient to be discharged home in 1-2 days  Leticia Perez, APRN  12/16/17  2:43 PM      Addendum:  Called the patient's room around 1450 to check on him.  He states that he hasn't had any further BMs and is beginning to feel gassy and distended.  No abdominal pain or nausea.   States that he thinks he overdid it because breakfast and lunch clear liquid trays came within an hour and a half of each other.  Encouraged ambulation.  Will leave NG in place overnight and keep him on clears for now.  If abdominal pain returns, will need to place NG back on LWS and consider surgical consult.

## 2017-12-17 LAB
ALBUMIN SERPL-MCNC: 3.5 G/DL (ref 3.2–4.8)
ANION GAP SERPL CALCULATED.3IONS-SCNC: 8 MMOL/L (ref 3–11)
BUN BLD-MCNC: 8 MG/DL (ref 9–23)
BUN/CREAT SERPL: 11.4 (ref 7–25)
CALCIUM SPEC-SCNC: 7.6 MG/DL (ref 8.7–10.4)
CHLORIDE SERPL-SCNC: 106 MMOL/L (ref 99–109)
CO2 SERPL-SCNC: 24 MMOL/L (ref 20–31)
CREAT BLD-MCNC: 0.7 MG/DL (ref 0.6–1.3)
D-LACTATE SERPL-SCNC: 0.7 MMOL/L (ref 0.5–2)
DEPRECATED RDW RBC AUTO: 41.9 FL (ref 37–54)
ERYTHROCYTE [DISTWIDTH] IN BLOOD BY AUTOMATED COUNT: 12.7 % (ref 11.3–14.5)
GFR SERPL CREATININE-BSD FRML MDRD: 111 ML/MIN/1.73
GLUCOSE BLD-MCNC: 99 MG/DL (ref 70–100)
HCT VFR BLD AUTO: 38.9 % (ref 38.9–50.9)
HGB BLD-MCNC: 13.4 G/DL (ref 13.1–17.5)
MAGNESIUM SERPL-MCNC: 1.9 MG/DL (ref 1.3–2.7)
MCH RBC QN AUTO: 31.2 PG (ref 27–31)
MCHC RBC AUTO-ENTMCNC: 34.4 G/DL (ref 32–36)
MCV RBC AUTO: 90.5 FL (ref 80–99)
PHOSPHATE SERPL-MCNC: 1.6 MG/DL (ref 2.4–5.1)
PLATELET # BLD AUTO: 135 10*3/MM3 (ref 150–450)
PMV BLD AUTO: 9.4 FL (ref 6–12)
POTASSIUM BLD-SCNC: 3.3 MMOL/L (ref 3.5–5.5)
RBC # BLD AUTO: 4.3 10*6/MM3 (ref 4.2–5.76)
SODIUM BLD-SCNC: 138 MMOL/L (ref 132–146)
WBC NRBC COR # BLD: 9.46 10*3/MM3 (ref 3.5–10.8)

## 2017-12-17 PROCEDURE — 85027 COMPLETE CBC AUTOMATED: CPT | Performed by: HOSPITALIST

## 2017-12-17 PROCEDURE — 99232 SBSQ HOSP IP/OBS MODERATE 35: CPT | Performed by: HOSPITALIST

## 2017-12-17 PROCEDURE — 83735 ASSAY OF MAGNESIUM: CPT | Performed by: HOSPITALIST

## 2017-12-17 PROCEDURE — 83605 ASSAY OF LACTIC ACID: CPT | Performed by: HOSPITALIST

## 2017-12-17 PROCEDURE — 25010000002 ENOXAPARIN PER 10 MG: Performed by: HOSPITALIST

## 2017-12-17 PROCEDURE — 80069 RENAL FUNCTION PANEL: CPT | Performed by: HOSPITALIST

## 2017-12-17 RX ORDER — BUSPIRONE HYDROCHLORIDE 10 MG/1
5 TABLET ORAL EVERY 12 HOURS SCHEDULED
Status: DISCONTINUED | OUTPATIENT
Start: 2017-12-17 | End: 2017-12-19 | Stop reason: HOSPADM

## 2017-12-17 RX ORDER — LORAZEPAM 0.5 MG/1
0.5 TABLET ORAL NIGHTLY PRN
Status: DISCONTINUED | OUTPATIENT
Start: 2017-12-17 | End: 2017-12-19 | Stop reason: HOSPADM

## 2017-12-17 RX ADMIN — SODIUM CHLORIDE 150 ML/HR: 9 INJECTION, SOLUTION INTRAVENOUS at 05:59

## 2017-12-17 RX ADMIN — BUSPIRONE HYDROCHLORIDE 5 MG: 10 TABLET ORAL at 20:22

## 2017-12-17 RX ADMIN — OXYCODONE HYDROCHLORIDE 5 MG: 5 TABLET ORAL at 02:17

## 2017-12-17 RX ADMIN — OXYCODONE HYDROCHLORIDE 5 MG: 5 TABLET ORAL at 20:25

## 2017-12-17 RX ADMIN — DOCUSATE SODIUM 100 MG: 100 CAPSULE, LIQUID FILLED ORAL at 09:45

## 2017-12-17 RX ADMIN — BUPROPION HYDROCHLORIDE 75 MG: 75 TABLET, FILM COATED ORAL at 20:22

## 2017-12-17 RX ADMIN — ENOXAPARIN SODIUM 40 MG: 40 INJECTION SUBCUTANEOUS at 18:22

## 2017-12-17 RX ADMIN — DOCUSATE SODIUM 100 MG: 100 CAPSULE, LIQUID FILLED ORAL at 18:22

## 2017-12-17 RX ADMIN — LORAZEPAM 0.5 MG: 0.5 TABLET ORAL at 20:29

## 2017-12-17 RX ADMIN — OXYCODONE HYDROCHLORIDE 5 MG: 5 TABLET ORAL at 09:44

## 2017-12-17 RX ADMIN — POTASSIUM PHOSPHATE, MONOBASIC AND POTASSIUM PHOSPHATE, DIBASIC 30 MMOL: 224; 236 INJECTION, SOLUTION INTRAVENOUS at 12:54

## 2017-12-17 RX ADMIN — BUSPIRONE HYDROCHLORIDE 5 MG: 10 TABLET ORAL at 10:28

## 2017-12-17 RX ADMIN — PANTOPRAZOLE SODIUM 40 MG: 40 TABLET, DELAYED RELEASE ORAL at 05:55

## 2017-12-17 NOTE — PLAN OF CARE
Problem: Patient Care Overview (Adult)  Goal: Plan of Care Review  Outcome: Ongoing (interventions implemented as appropriate)    12/17/17 6989   Patient Care Overview   Progress no change   Outcome Evaluation   Outcome Summary/Follow up Plan VSS; no overnight events. NPO x meds. NG LWS & IVF continues. + watery BM tonight.   Coping/Psychosocial Response Interventions   Plan Of Care Reviewed With patient         Problem: Bowel Obstruction (Adult)  Goal: Signs and Symptoms of Listed Potential Problems Will be Absent or Manageable (Bowel Obstruction)  Outcome: Ongoing (interventions implemented as appropriate)

## 2017-12-17 NOTE — PROGRESS NOTES
[facility]  PROGRESS NOTE    Patient Name: Hayden Farah  : 1945  MRN: [id number]    Date of Admission: 2017  Length of Stay: 3  Primary Care Physician: Bean Jane MD    Subjective   Subjective     CC:  FU SBO    HPI:  Had abdominal bloating and distension after having breakfast and lunch within 2 hours. Resolved with hooking NGT to LWS. Minimal abdominal pain. Ambulating. Having BMs. Son and DIL at bedside.    Review of Systems  Gen- No fevers, chills  CV- No chest pain, palpitations  Resp- No cough, dyspnea  GI- No N/V/D, +abd pain    Otherwise ROS is negative except as mentioned in the HPI.    Objective   Objective     Vital Signs:   Temp:  [97.4 °F (36.3 °C)-98.6 °F (37 °C)] 97.4 °F (36.3 °C)  Heart Rate:  [67-81] 67  Resp:  [16-18] 18  BP: (132-144)/(60-66) 144/66        Physical Exam:  Constitutional: No acute distress, awake, alert on RA sitting in chair  Eyes: PERRLA, sclerae anicteric, no conjunctival injection  HENT: NCAT, mucous membranes moist, NGT in R nostril  Neck: Supple, no thyromegaly, no lymphadenopathy, trachea midline  Respiratory: Clear to auscultation bilaterally, nonlabored respirations   Cardiovascular: RRR, no murmurs, rubs, or gallops, palpable pedal pulses bilaterally  Gastrointestinal: Positive bowel sounds, soft, mild tenderness to palpation in LLQ, nondistended  Musculoskeletal: No bilateral ankle edema, no clubbing or cyanosis to extremities  Psychiatric: Appropriate affect, cooperative  Neurologic: Oriented x 3, strength symmetric in all extremities, Cranial Nerves grossly intact to confrontation, speech clear  Skin: No rashes    Results Reviewed:  I have personally reviewed current lab, radiology, and data and agree.      Results from last 7 days  Lab Units 17  0633 17  0556 17  2119   WBC 10*3/mm3 9.46 8.10 10.17   HEMOGLOBIN g/dL 13.4 13.6 16.4   HEMATOCRIT % 38.9 41.3 48.5   PLATELETS  10*3/mm3 135* 129* 154       Results from last 7 days  Lab Units 12/17/17  0633 12/16/17  1951 12/16/17  0556 12/15/17  0308 12/14/17  2119   SODIUM mmol/L 138  --  139 140 138   POTASSIUM mmol/L 3.3* 3.8 3.3* 3.6 3.3*   CHLORIDE mmol/L 106  --  108 106 102   CO2 mmol/L 24.0  --  21.0 21.0 25.0   BUN mg/dL 8*  --  12 19 19   CREATININE mg/dL 0.70  --  0.70 0.80 0.80   GLUCOSE mg/dL 99  --  75 111* 126*   CALCIUM mg/dL 7.6*  --  7.7* 8.1* 9.1   ALT (SGPT) U/L  --   --  17  --  24   AST (SGOT) U/L  --   --  23  --  27     No results found for: BNP  No results found for: PHART    Microbiology Results Abnormal     None          Imaging Results (last 24 hours)     ** No results found for the last 24 hours. **             I have reviewed the medications.    Assessment/Plan   Assessment / Plan     Hospital Problem List     Hypokalemia    SBO (small bowel obstruction)    HTN (hypertension)    H/O prostate cancer        Assessment & Plan:  - SBO: Medical management. Abd exam benign. NGT to clamp with repeat trial of CLD today. PO meds okay. Pain control with Oxycodone PRN and IV Morphine PRN. Keep K>4.0, Mg>2.0 and Phos>3.5  - HypoK, HypoPhos: Replete  - HTN: Holding home atenolol and lisinopril for normotension. Restart when appropriate  - Anxiety: Started buspirone BID    DVT Prophylaxis: pLOV    CODE STATUS: Full Code    Disposition: I expect the patient to be discharged home in 1-2 days  Tanika Moreno MD  12/17/17  3:24 PM

## 2017-12-17 NOTE — PLAN OF CARE
Problem: Patient Care Overview (Adult)  Goal: Plan of Care Review  Outcome: Ongoing (interventions implemented as appropriate)    12/17/17 0422 12/17/17 1200   Patient Care Overview   Progress no change --    Outcome Evaluation   Outcome Summary/Follow up Plan VSS; no overnight events. NPO x meds. NG LWS & IVF continues. + watery BM tonight. --    Coping/Psychosocial Response Interventions   Plan Of Care Reviewed With --  patient;family       Goal: Adult Individualization and Mutuality  Outcome: Ongoing (interventions implemented as appropriate)    12/17/17 1448   Individualization   Patient Specific Preferences likes to be called Whayne   Patient Specific Goals pain management       Goal: Discharge Needs Assessment  Outcome: Ongoing (interventions implemented as appropriate)    12/15/17 1501   Discharge Needs Assessment   Concerns To Be Addressed no discharge needs identified   Readmission Within The Last 30 Days no previous admission in last 30 days   Equipment Needed After Discharge none   Discharge Disposition home or self-care   Current Health   Anticipated Changes Related to Illness none   Living Environment   Transportation Available family or friend will provide;car   Self-Care   Equipment Currently Used at Home none         Problem: Bowel Obstruction (Adult)  Intervention: Monitor/Manage Gastrointestinal Function/Elimination    12/17/17 1438   Monitor/Manage Gastrointestinal Function/Elimination   Abdominal Appearance rounded   Activity   Activity Type activity adjusted per tolerance       Intervention: Monitor/Manage Pain    12/16/17 2006 12/17/17 0610 12/17/17 1438   Manage Acute Burn Pain   Bowel Intervention ambulation promoted --  --    Pain Management Interventions --  --  pillow support;relaxation techniques promoted   Safety Interventions   Medication Review/Management --  medications reviewed --        Intervention: Support/Optimize Psychosocial Response to Illness    12/15/17 2000 12/16/17 2006  12/17/17 1438   Coping Strategies   Family/Support System Care caregiver stress acknowledged;support provided --  --    Supportive Measures --  --  active listening utilized   Coping/Psychosocial Interventions   Environmental Support --  calm environment promoted --        Intervention: Promote/Optimize Nutrition    12/17/17 0900   Hygiene Care Assistance   Oral Care teeth brushed       Intervention: Monitor/Manage Fluid Electrolyte Balance    12/16/17 0830 12/17/17 1438   Positioning   Positioning --  semi-Fowlers   Nutrition Interventions   Fluid/Electrolyte Management intravenous fluid replacement initiated;electrolyte supplement initiated --          Goal: Signs and Symptoms of Listed Potential Problems Will be Absent or Manageable (Bowel Obstruction)  Outcome: Ongoing (interventions implemented as appropriate)    12/17/17 0422   Bowel Obstruction   Problems Assessed (Bowel Obstruction) all   Problems Present (Bowel Obstruction) pain;diarrhea;fluid volume deficit/hypovolemia

## 2017-12-18 LAB
ALBUMIN SERPL-MCNC: 3.5 G/DL (ref 3.2–4.8)
ANION GAP SERPL CALCULATED.3IONS-SCNC: 12 MMOL/L (ref 3–11)
BUN BLD-MCNC: 8 MG/DL (ref 9–23)
BUN/CREAT SERPL: 11.4 (ref 7–25)
CA-I SERPL ISE-MCNC: 1.12 MMOL/L (ref 1.12–1.32)
CALCIUM SPEC-SCNC: 7.7 MG/DL (ref 8.7–10.4)
CHLORIDE SERPL-SCNC: 104 MMOL/L (ref 99–109)
CO2 SERPL-SCNC: 23 MMOL/L (ref 20–31)
CREAT BLD-MCNC: 0.7 MG/DL (ref 0.6–1.3)
GFR SERPL CREATININE-BSD FRML MDRD: 111 ML/MIN/1.73
GLUCOSE BLD-MCNC: 83 MG/DL (ref 70–100)
MAGNESIUM SERPL-MCNC: 1.9 MG/DL (ref 1.3–2.7)
PHOSPHATE SERPL-MCNC: 2.2 MG/DL (ref 2.4–5.1)
POTASSIUM BLD-SCNC: 3.2 MMOL/L (ref 3.5–5.5)
SODIUM BLD-SCNC: 139 MMOL/L (ref 132–146)

## 2017-12-18 PROCEDURE — 83735 ASSAY OF MAGNESIUM: CPT | Performed by: HOSPITALIST

## 2017-12-18 PROCEDURE — 82330 ASSAY OF CALCIUM: CPT | Performed by: HOSPITALIST

## 2017-12-18 PROCEDURE — 94799 UNLISTED PULMONARY SVC/PX: CPT

## 2017-12-18 PROCEDURE — 25010000002 ENOXAPARIN PER 10 MG: Performed by: HOSPITALIST

## 2017-12-18 PROCEDURE — 99232 SBSQ HOSP IP/OBS MODERATE 35: CPT | Performed by: HOSPITALIST

## 2017-12-18 PROCEDURE — 80069 RENAL FUNCTION PANEL: CPT | Performed by: HOSPITALIST

## 2017-12-18 RX ORDER — DOCUSATE SODIUM 100 MG/1
100 CAPSULE, LIQUID FILLED ORAL 2 TIMES DAILY PRN
Status: DISCONTINUED | OUTPATIENT
Start: 2017-12-18 | End: 2017-12-19 | Stop reason: HOSPADM

## 2017-12-18 RX ORDER — POTASSIUM CHLORIDE 750 MG/1
40 CAPSULE, EXTENDED RELEASE ORAL AS NEEDED
Status: DISCONTINUED | OUTPATIENT
Start: 2017-12-18 | End: 2017-12-19 | Stop reason: HOSPADM

## 2017-12-18 RX ORDER — POTASSIUM CHLORIDE 1.5 G/1.77G
40 POWDER, FOR SOLUTION ORAL AS NEEDED
Status: DISCONTINUED | OUTPATIENT
Start: 2017-12-18 | End: 2017-12-19 | Stop reason: HOSPADM

## 2017-12-18 RX ORDER — POTASSIUM PHOS IN 0.9 % NACL 15MMOL/250
15 PLASTIC BAG, INJECTION (ML) INTRAVENOUS ONCE
Status: DISCONTINUED | OUTPATIENT
Start: 2017-12-18 | End: 2017-12-18 | Stop reason: SDUPTHER

## 2017-12-18 RX ORDER — ATENOLOL 50 MG/1
50 TABLET ORAL
Status: DISCONTINUED | OUTPATIENT
Start: 2017-12-18 | End: 2017-12-19 | Stop reason: HOSPADM

## 2017-12-18 RX ADMIN — POTASSIUM CHLORIDE 40 MEQ: 750 CAPSULE, EXTENDED RELEASE ORAL at 20:40

## 2017-12-18 RX ADMIN — BUSPIRONE HYDROCHLORIDE 5 MG: 10 TABLET ORAL at 20:41

## 2017-12-18 RX ADMIN — DOCUSATE SODIUM 100 MG: 100 CAPSULE, LIQUID FILLED ORAL at 09:11

## 2017-12-18 RX ADMIN — POTASSIUM CHLORIDE 40 MEQ: 750 CAPSULE, EXTENDED RELEASE ORAL at 17:09

## 2017-12-18 RX ADMIN — OXYCODONE HYDROCHLORIDE 5 MG: 5 TABLET ORAL at 14:07

## 2017-12-18 RX ADMIN — ENOXAPARIN SODIUM 40 MG: 40 INJECTION SUBCUTANEOUS at 17:10

## 2017-12-18 RX ADMIN — PANTOPRAZOLE SODIUM 40 MG: 40 TABLET, DELAYED RELEASE ORAL at 05:34

## 2017-12-18 RX ADMIN — BUSPIRONE HYDROCHLORIDE 5 MG: 10 TABLET ORAL at 09:11

## 2017-12-18 RX ADMIN — OXYCODONE HYDROCHLORIDE 5 MG: 5 TABLET ORAL at 05:34

## 2017-12-18 RX ADMIN — ATENOLOL 50 MG: 50 TABLET ORAL at 20:40

## 2017-12-18 RX ADMIN — BUPROPION HYDROCHLORIDE 75 MG: 75 TABLET, FILM COATED ORAL at 20:40

## 2017-12-18 RX ADMIN — POTASSIUM PHOSPHATE, MONOBASIC AND POTASSIUM PHOSPHATE, DIBASIC: 224; 236 INJECTION, SOLUTION INTRAVENOUS at 11:28

## 2017-12-18 RX ADMIN — LORAZEPAM 0.5 MG: 0.5 TABLET ORAL at 22:01

## 2017-12-18 NOTE — PROGRESS NOTES
"Adult Nutrition  Assessment/PES    Patient Name:  Hayden Farah  YOB: 1945  MRN: [id number]  Admit Date:  12/14/2017    Assessment Date:  12/18/2017          Reason for Assessment       12/18/17 0951    Reason for Assessment    Reason For Assessment/Visit NPO/Clr Policy    Time Spent (min) 30    Cardiac HTN    Gastrointestinal SBO   Mulitple BM's    Metabolic/ICU Hypokalemia    Oncology Prostate cancer   s/p prostatectomy    Psychosocial --   Anxiety              Nutrition/Diet History       12/18/17 0953    Nutrition/Diet History    Typical Food/Fluid Intake Patient advised RD, has not eaten in the past 7 days. Patient stated he ate 1/2 cherry icee at breakfast today, cold items soothe his throat.     Supplemental Drinks/Foods/Additives He is receptive to strawberry boost plus.            Anthropometrics       12/18/17 0953    Anthropometrics    Height Method Stated    Height 167.6 cm (65.98\")    Weight Method Standing scale    Weight 78.4 kg (172 lb 12.8 oz)    Ideal Body Weight (IBW)    Ideal Body Weight (IBW), Male (kg) 65.26    % Ideal Body Weight 120.36    Body Mass Index (BMI)    BMI (kg/m2) 27.96            Labs/Tests/Procedures/Meds       12/18/17 0954    Labs/Tests/Procedures/Meds    Labs/Tests Review Reviewed;K+                Nutrition Prescription Ordered       12/18/17 0954    Nutrition Prescription PO    Current PO Diet Clear Liquid            Evaluation of Received Nutrient/Fluid Intake       12/18/17 1020    PO Evaluation    Number of Meals 1    % PO Intake 100            Problem/Interventions:        Problem 1       12/18/17 1020    Nutrition Diagnoses Problem 1    Problem 1 Inadequate Intake/Infusion    Inadequate Intake Type Oral    Etiology (related to) --   clinical condition    Signs/Symptoms (evidenced by) Report/Observation    Reported/Observed By Patient                    Intervention Goal       12/18/17 1021    Intervention Goal    PO Advance diet;Tolerate PO          "   Nutrition Intervention       12/18/17 1021    Nutrition Intervention    RD/Tech Action Advise alternate selection;Interview for preference;Menu provided;Recommend/ordered;Supplement provided;Follow Tx progress   Advised per verbal order per TEDDY Moreno MD, may advance to full lquids at lunch. If does well advance to GI soft at dinner. Advised nsg., pt. requests at lunch today be sent  during the late tray meal service due to pt. receiving full liquids now.            Nutrition Prescription       12/18/17 1023    Nutrition Prescription PO    PO Prescription Begin/change supplement;Begin/change diet    Begin/Change Diet to Full Liquid    Supplement Boost Plus    Supplement Frequency 3 times a day    New PO Prescription Ordered? Yes            Education/Evaluation       12/18/17 1024    Education    Education --   MNT discussed with pt.    Monitor/Evaluation    Monitor Per protocol        Electronically signed by:  Evita Verdugo RD  12/18/17 10:28 AM

## 2017-12-18 NOTE — PROGRESS NOTES
Continued Stay Note  [facility]     Patient Name: Hayden Farah  MRN: [id number]  Today's Date: 12/18/2017    Admit Date: 12/14/2017          Discharge Plan       12/18/17 1428    Case Management/Social Work Plan    Plan Home    Additional Comments I spoke with patient who had been up walking. Continues to anticipate discharge to home when that time comes.              Discharge Codes     None        Expected Discharge Date and Time     Expected Discharge Date Expected Discharge Time    Dec 18, 2017             Charmaine Morales RN

## 2017-12-18 NOTE — PROGRESS NOTES
[facility] Medicine Services  PROGRESS NOTE    Patient Name: Hayden Farah  : 1945  MRN: [id number]    Date of Admission: 2017  Length of Stay: 4  Primary Care Physician: Bean Jane MD    Subjective   Subjective     CC:  FU SBO    HPI:  Patient was able to tolerate clear liquids diet yesterday.  Nasogastric tube remained clamped since yesterday morning.  Abdominal pain well-controlled.  Continues to have BMs.  He has been ambulating on his own.     Review of Systems  Gen- No fevers, chills  CV- No chest pain, palpitations  Resp- No cough, dyspnea  GI- No N/V/D, +abd pain    Otherwise ROS is negative except as mentioned in the HPI.    Objective   Objective     Vital Signs:   Temp:  [98.3 °F (36.8 °C)-99.7 °F (37.6 °C)] 98.4 °F (36.9 °C)  Heart Rate:  [] 74  Resp:  [8-18] 18  BP: (140-163)/(67-88) 141/68        Physical Exam:  Constitutional: No acute distress, awake, alert on RA sitting in chair  Eyes: PERRLA, sclerae anicteric, no conjunctival injection  HENT: NCAT, mucous membranes moist, NGT in R nostril  Neck: Supple, no thyromegaly, no lymphadenopathy, trachea midline  Respiratory: Clear to auscultation bilaterally, nonlabored respirations   Cardiovascular: RRR, no murmurs, rubs, or gallops, palpable pedal pulses bilaterally  Gastrointestinal: Positive bowel sounds, soft, mild tenderness to palpation in LLQ, nondistended  Musculoskeletal: No bilateral ankle edema, no clubbing or cyanosis to extremities  Psychiatric: Appropriate affect, cooperative  Neurologic: Oriented x 3, strength symmetric in all extremities, Cranial Nerves grossly intact to confrontation, speech clear  Skin: No rashes    Results Reviewed:  I have personally reviewed current lab, radiology, and data and agree.      Results from last 7 days  Lab Units 17  0633 17  0556 17  4529   WBC 10*3/mm3 9.46 8.10 10.17   HEMOGLOBIN g/dL 13.4 13.6 16.4   HEMATOCRIT % 38.9 41.3  48.5   PLATELETS 10*3/mm3 135* 129* 154       Results from last 7 days  Lab Units 12/18/17  0618 12/17/17  0633 12/16/17  1951 12/16/17  0556  12/14/17  2119   SODIUM mmol/L 139 138  --  139  < > 138   POTASSIUM mmol/L 3.2* 3.3* 3.8 3.3*  < > 3.3*   CHLORIDE mmol/L 104 106  --  108  < > 102   CO2 mmol/L 23.0 24.0  --  21.0  < > 25.0   BUN mg/dL 8* 8*  --  12  < > 19   CREATININE mg/dL 0.70 0.70  --  0.70  < > 0.80   GLUCOSE mg/dL 83 99  --  75  < > 126*   CALCIUM mg/dL 7.7* 7.6*  --  7.7*  < > 9.1   ALT (SGPT) U/L  --   --   --  17  --  24   AST (SGOT) U/L  --   --   --  23  --  27   < > = values in this interval not displayed.  No results found for: BNP  No results found for: PHART    Microbiology Results Abnormal     None          Imaging Results (last 24 hours)     ** No results found for the last 24 hours. **             I have reviewed the medications.    Assessment/Plan   Assessment / Plan     Hospital Problem List     Hypokalemia    SBO (small bowel obstruction)    HTN (hypertension)    H/O prostate cancer        Assessment & Plan:  - SBO: Improving. Medical management. Abd exam benign. Tolerated full liquids diet this morning.  Remove NG tube.  Advance to GI soft diet for dinner. Continue pain control with Oxycodone PRN and IV Morphine PRN. Keep K>4.0, Mg>2.0 and Phos>3.5  - HypoK, HypoPhos: Replete  - HTN: Restarted home atenolol. Continuing to hold lisinopril for normotension. Restart when appropriate  - Anxiety: Started buspirone BID    DVT Prophylaxis: pLOV    CODE STATUS: Full Code    Disposition: I expect the patient to be discharged home in 1-2 days  Tanika Moreno MD  12/18/17  4:48 PM

## 2017-12-18 NOTE — PLAN OF CARE
Problem: Patient Care Overview (Adult)  Goal: Plan of Care Review  Outcome: Ongoing (interventions implemented as appropriate)    12/18/17 1547   Patient Care Overview   Progress improving   Outcome Evaluation   Outcome Summary/Follow up Plan Pt stable advanced diet today tolerating well   Coping/Psychosocial Response Interventions   Plan Of Care Reviewed With patient       Goal: Discharge Needs Assessment  Outcome: Ongoing (interventions implemented as appropriate)    12/15/17 1501   Discharge Needs Assessment   Concerns To Be Addressed no discharge needs identified   Readmission Within The Last 30 Days no previous admission in last 30 days   Discharge Disposition home or self-care   Current Health   Anticipated Changes Related to Illness none   Living Environment   Transportation Available family or friend will provide;car   Self-Care   Equipment Currently Used at Home none         Problem: Bowel Obstruction (Adult)  Goal: Signs and Symptoms of Listed Potential Problems Will be Absent or Manageable (Bowel Obstruction)  Outcome: Ongoing (interventions implemented as appropriate)    12/18/17 0444 12/18/17 1547   Bowel Obstruction   Problems Assessed (Bowel Obstruction) pain;electrolyte/acid-base imbalance;fluid volume deficit/hypovolemia;undernutrition --    Problems Present (Bowel Obstruction) --  diarrhea;electrolyte/acid-base imbalance

## 2017-12-18 NOTE — PLAN OF CARE
Problem: Patient Care Overview (Adult)  Goal: Plan of Care Review  Outcome: Ongoing (interventions implemented as appropriate)    12/18/17 4331   Patient Care Overview   Progress improving   Outcome Evaluation   Outcome Summary/Follow up Plan pt had ml. vazquez melendrez. slept well, pain controlled, no nause or vomiting   Coping/Psychosocial Response Interventions   Plan Of Care Reviewed With patient       Goal: Adult Individualization and Mutuality  Outcome: Ongoing (interventions implemented as appropriate)  Goal: Discharge Needs Assessment  Outcome: Ongoing (interventions implemented as appropriate)    Problem: Bowel Obstruction (Adult)  Goal: Signs and Symptoms of Listed Potential Problems Will be Absent or Manageable (Bowel Obstruction)  Outcome: Ongoing (interventions implemented as appropriate)

## 2017-12-19 VITALS
WEIGHT: 172.8 LBS | DIASTOLIC BLOOD PRESSURE: 64 MMHG | HEART RATE: 52 BPM | RESPIRATION RATE: 18 BRPM | SYSTOLIC BLOOD PRESSURE: 137 MMHG | TEMPERATURE: 97.8 F | BODY MASS INDEX: 27.77 KG/M2 | HEIGHT: 66 IN | OXYGEN SATURATION: 98 %

## 2017-12-19 LAB
ALBUMIN SERPL-MCNC: 3.6 G/DL (ref 3.2–4.8)
ANION GAP SERPL CALCULATED.3IONS-SCNC: 6 MMOL/L (ref 3–11)
BUN BLD-MCNC: 11 MG/DL (ref 9–23)
BUN/CREAT SERPL: 15.7 (ref 7–25)
CALCIUM SPEC-SCNC: 8.2 MG/DL (ref 8.7–10.4)
CHLORIDE SERPL-SCNC: 105 MMOL/L (ref 99–109)
CO2 SERPL-SCNC: 27 MMOL/L (ref 20–31)
CREAT BLD-MCNC: 0.7 MG/DL (ref 0.6–1.3)
GFR SERPL CREATININE-BSD FRML MDRD: 111 ML/MIN/1.73
GLUCOSE BLD-MCNC: 96 MG/DL (ref 70–100)
PHOSPHATE SERPL-MCNC: 2.1 MG/DL (ref 2.4–5.1)
POTASSIUM BLD-SCNC: 3.8 MMOL/L (ref 3.5–5.5)
SODIUM BLD-SCNC: 138 MMOL/L (ref 132–146)

## 2017-12-19 PROCEDURE — 99238 HOSP IP/OBS DSCHRG MGMT 30/<: CPT | Performed by: NURSE PRACTITIONER

## 2017-12-19 PROCEDURE — 80069 RENAL FUNCTION PANEL: CPT | Performed by: HOSPITALIST

## 2017-12-19 RX ORDER — BUSPIRONE HYDROCHLORIDE 5 MG/1
5 TABLET ORAL EVERY 12 HOURS SCHEDULED
Qty: 60 TABLET | Refills: 0 | Status: SHIPPED | OUTPATIENT
Start: 2017-12-19

## 2017-12-19 RX ADMIN — BUSPIRONE HYDROCHLORIDE 5 MG: 10 TABLET ORAL at 08:13

## 2017-12-19 RX ADMIN — PANTOPRAZOLE SODIUM 40 MG: 40 TABLET, DELAYED RELEASE ORAL at 05:36

## 2017-12-19 NOTE — PROGRESS NOTES
Adult Nutrition  Assessment/PES    Patient Name:  Hayden Farah  YOB: 1945  MRN: [id number]  Admit Date:  12/14/2017    Assessment Date:  12/19/2017          Reason for Assessment       12/19/17 1123    Reason for Assessment    Reason For Assessment/Visit follow up protocol    Time Spent (min) 15    Diagnosis --   per notes this admission                  Labs/Tests/Procedures/Meds       12/19/17 1126    Labs/Tests/Procedures/Meds    Labs/Tests Review Reviewed                Nutrition Prescription Ordered       12/19/17 1127    Nutrition Prescription PO    Current PO Diet Regular    Supplement Boost Plus    Supplement Frequency 3 times a day    Common Modifiers GI Soft/[place]            Evaluation of Received Nutrient/Fluid Intake       12/19/17 1127    PO Evaluation    Number of Meals 2    % PO Intake 63            Problem/Interventions:        Problem 1       12/19/17 1128    Nutrition Diagnoses Problem 1    Problem 1 Inadequate Intake/Infusion    Inadequate Intake Type Oral    Etiology (related to) --   clinical condition    Signs/Symptoms (evidenced by) PO Intake    Percent (%) intake recorded 63 %    Over number of meals 2                    Intervention Goal       12/19/17 1129    Intervention Goal    PO Establish PO            Nutrition Intervention       12/19/17 1129    Nutrition Intervention    RD/Tech Action Follow Tx progress;Supplement provided   Patient advised RD being discharged today.              Education/Evaluation       12/19/17 1130    Education    Education Education offered and refused    Monitor/Evaluation    Monitor Per protocol        Electronically signed by:  Evita Verdugo RD  12/19/17 11:30 AM

## 2017-12-19 NOTE — DISCHARGE SUMMARY
[facility] Medicine Services  DISCHARGE SUMMARY    Patient Name: Hayden Farah  : 1945  MRN: [id number]    Date of Admission: 2017  Date of Discharge:  17  Primary Care Physician: Bean Jane MD    Consults     No orders found from 11/15/2017 to 12/15/2017.        Hospital Course     Presenting Problem:   SBO (small bowel obstruction) [K56.609]    Active Hospital Problems (** Indicates Principal Problem)    Diagnosis Date Noted   • Hypokalemia [E87.6] 2017   • SBO (small bowel obstruction) [K56.609] 2017   • HTN (hypertension) [I10] 2017   • H/O prostate cancer [Z85.46] 2017      Resolved Hospital Problems    Diagnosis Date Noted Date Resolved   No resolved problems to display.          Hospital Course:  Hayden Farah is a 72 y.o. male with a PMH of cancer, celiac disease, HTN, and pneumonia. Patient presented to [facility] with complaints of  3 day hx of rlq abd pain a/w n/v/anorexia and 1 day of fevers 100+.   IN ED Ct of abdomen showed  Multiple loops of mildly dilated, gas and fluid-filled mid and distal small   bowel, with apparent transition to normal caliber small bowel within the right lower quadrant.  Findings most compatible with small bowel obstruction.    Patient was admitted to hospital medicine for further evaluation. NG tube was placed, IVF's, antiemetics, and pain control. Diet was slowly advanced and NG tube was clamped. He has been able to tolerated a GI soft diet for 24 hours without any N/V. He has had bm while in hospital.     Patient had some anxiety while in hospital and was started on Buspar. He felt medication has helped and would like to continue at discharged. Patient antihypertensive medication were slowly started back and will resume at d/c. Patient was instructed to start HCTZ in a few days once PO intake has increased back to normal.     Patient has improved clinically and will be discharged home today.  He will need to follow up with his PCP in one week. Patient wants a  referral to DR. Tierney office for colonoscopy at d/c.            Day of Discharge     HPI:   Patient sitting up in chair in NAD. He has been up walking in hallways. He has tolerated his diet today and yesterday. Had bm this morning. He denies any n/v/. Still some slight abd tenderness on palpation but has improved significantly  since admit.     Review of Systems   Constitutional: Negative for activity change, chills and fever.   HENT: Negative.    Respiratory: Negative for shortness of breath and wheezing.    Cardiovascular: Negative for chest pain.   Gastrointestinal: Positive for abdominal pain. Negative for abdominal distention, nausea and vomiting.        Improved significantly since admit    Genitourinary: Negative for dysuria.   Musculoskeletal: Negative for gait problem.         Otherwise ROS is negative except as mentioned in the HPI.    Vital Signs:   Temp:  [97.8 °F (36.6 °C)-99.2 °F (37.3 °C)] 97.8 °F (36.6 °C)  Heart Rate:  [52-80] 52  Resp:  [18] 18  BP: (137-164)/(64-75) 137/64     Physical Exam:  Constitutional: No acute distress, awake, alert  Eyes: PERRLA, sclerae anicteric, no conjunctival injection  HENT: NCAT, mucous membranes moist  Neck: Supple, no thyromegaly, no lymphadenopathy, trachea midline  Respiratory: Clear to auscultation bilaterally, nonlabored respirations   Cardiovascular: RRR, no murmurs, rubs, or gallops, palpable pedal pulses bilaterally  Gastrointestinal: Positive bowel sounds, soft, slight tenderness on palpation in RLQ and periumbilical region , nondistended  Musculoskeletal: No bilateral ankle edema, no clubbing or cyanosis to extremities  Psychiatric: Appropriate affect, cooperative  Neurologic: Oriented x 3, strength symmetric in all extremities, Cranial Nerves grossly intact to confrontation, speech clear  Skin: No rashes      Pertinent  and/or Most Recent Results       Results from last 7 days  Lab  Units 12/19/17  0521 12/18/17  0618 12/17/17  0633 12/16/17  1951 12/16/17  0556 12/15/17  0308 12/14/17 2119   WBC 10*3/mm3  --   --  9.46  --  8.10  --  10.17   HEMOGLOBIN g/dL  --   --  13.4  --  13.6  --  16.4   HEMATOCRIT %  --   --  38.9  --  41.3  --  48.5   PLATELETS 10*3/mm3  --   --  135*  --  129*  --  154   SODIUM mmol/L 138 139 138  --  139 140 138   POTASSIUM mmol/L 3.8 3.2* 3.3* 3.8 3.3* 3.6 3.3*   CHLORIDE mmol/L 105 104 106  --  108 106 102   CO2 mmol/L 27.0 23.0 24.0  --  21.0 21.0 25.0   BUN mg/dL 11 8* 8*  --  12 19 19   CREATININE mg/dL 0.70 0.70 0.70  --  0.70 0.80 0.80   GLUCOSE mg/dL 96 83 99  --  75 111* 126*   CALCIUM mg/dL 8.2* 7.7* 7.6*  --  7.7* 8.1* 9.1       Results from last 7 days  Lab Units 12/16/17  0556 12/14/17 2119   BILIRUBIN mg/dL 1.6* 1.3*   ALK PHOS U/L 62 81   ALT (SGPT) U/L 17 24   AST (SGOT) U/L 23 27           Invalid input(s): TG, LDLREALC      Brief Urine Lab Results  (Last result in the past 365 days)      Color   Clarity   Blood   Leuk Est   Nitrite   Protein   CREAT   Urine HCG        12/14/17 2258 Yellow Clear Moderate (2+)(A) Negative Negative Negative               Microbiology Results Abnormal     None          Imaging Results (all)     Procedure Component Value Units Date/Time    CT Abdomen Pelvis With Contrast [[id number]] Collected:  12/14/17 2115     Updated:  12/14/17 2250    Narrative:       EXAM:  CT Abdomen and Pelvis With Intravenous Contrast    CLINICAL HISTORY:  72 years old, male; Pain; Abdominal pain; Localized; Right upper quadrant   (ruq); Prior surgery; Surgery date: 6+ months; Surgery type: Nicole; Additional   info: Abdominal pain with n/v and borderline fever    TECHNIQUE:  Axial computed tomography images of the abdomen and pelvis with intravenous   contrast.  All CT scans at this facility use one or more dose reduction   techniques, viz.: automated exposure control; ma/kV adjustment per patient size   (including targeted exams where dose is  matched to indication; i.e. head); or   iterative reconstruction technique.  Coronal reformatted images were created and reviewed.    CONTRAST:  99 mL of isovue 300 administered intravenously.    COMPARISON:  CR - OX HIP AP PELVIS AN 1 LATERAL PNL 2014-05-01 17:28    FINDINGS:  Lower thorax:  Granuloma within the lingula.    ABDOMEN:  Liver:  Normal.  Gallbladder and bile ducts:  Gallbladder is surgically absent.  Pancreas:  Normal.  Spleen:  Splenic calcifications, compatible with prior granulomatous disease.    Splenomegaly.  Adrenals:  Normal.  Kidneys and ureters:  Bilateral simple renal cysts.  Stomach and bowel:  Multiple loops of mildly dilated, gas and fluid-filled mid   and distal small bowel, with apparent transition to normal caliber small bowel   within the right lower quadrant.  Colonic diverticulosis.  Appendix:  The appendix is normal.    PELVIS:  Bladder:  Normal.  Reproductive:  Normal as visualized.    ABDOMEN and PELVIS:  Intraperitoneal space:  Normal.  No free air.  No significant fluid collection.  Bones/joints:  Degenerative changes of the hips and sacroiliac joints.    Multilevel thoracolumbar spine degenerative changes.  No acute fracture.  No   dislocation.  Soft tissues:  Small fat-containing umbilical hernia.  Small fat-containing   left inguinal hernia, without acute complications.  Vasculature:  Normal.  No abdominal aortic aneurysm.  Lymph nodes:  Normal.      Impression:         1.  Multiple loops of mildly dilated, gas and fluid-filled mid and distal small   bowel, with apparent transition to normal caliber small bowel within the right   lower quadrant.  Findings most compatible with small bowel obstruction.    2.  Incidental/non-acute findings are described above.    THIS DOCUMENT HAS BEEN ELECTRONICALLY SIGNED BY NIKKY TAN MD                 Discharge Details      Hayden Farah   Home Medication Instructions TREV:[id number]    Printed on:12/19/17 1037   Medication  Information                      atenolol (TENORMIN) 100 MG tablet  Take  by mouth Every Night. Unsure of dose              buPROPion (WELLBUTRIN) 75 MG tablet  Take  by mouth Every Night. Unsure of dose              hydrochlorothiazide (MICROZIDE) 12.5 MG capsule  Take  by mouth Daily. Unsure of dose             lisinopril (PRINIVIL,ZESTRIL) 10 MG tablet  Take  by mouth Every Night. Unsure of dose              pantoprazole (PROTONIX) 20 MG EC tablet  Take  by mouth Daily. Unsure of dose                   Discharge Disposition:  Home or Self Care    Discharge Diet:  Diet Instructions     Diet: Regular; Thin       Discharge Diet:  Regular   Fluid Consistency:  Thin   Gi soft bland                 Discharge Activity:   Activity Instructions     Activity as Tolerated           Measure Blood Pressure                     Special Instructions:    Future Appointments  Date Time Provider Department Center   1/10/2018 8:00 AM [facility] ECHO/VASC CART RM2  [facility] NON [facility]   1/10/2018 10:30 AM [facility] ADMIN RM  [facility] CARD [facility]   1/10/2018 11:00 AM [facility] SCAN ROOM  [facility] CARD [facility]   1/10/2018 11:30 AM [facility] STRESS LAB 2  [facility] CARD [facility]   1/10/2018 12:00 PM [facility] SCAN ROOM  [facility] CARD [facility]       Additional Instructions for the Follow-ups that You Need to Schedule     Discharge Follow-up with PCP    As directed    Follow Up Details:  one week after 12/26 patient will be out of town till then           Discharge Follow-up with Specified Provider: Dr. Tierney    As directed    To:  Dr. Tierney    Follow Up Details:  time for colonoscopy had polyps removed before. wants to establish care with Dr. Tierney                     Time Spent on Discharge:  25 min     GEOVANNA Mena  12/19/17  10:37 AM

## 2018-01-10 ENCOUNTER — APPOINTMENT (OUTPATIENT)
Dept: CARDIOLOGY | Facility: HOSPITAL | Age: 73
End: 2018-01-10
Attending: INTERNAL MEDICINE

## 2018-01-23 ENCOUNTER — HOSPITAL ENCOUNTER (OUTPATIENT)
Dept: CARDIOLOGY | Facility: HOSPITAL | Age: 73
Discharge: HOME | End: 2018-01-23
Attending: INTERNAL MEDICINE

## 2018-01-23 ENCOUNTER — HOSPITAL ENCOUNTER (OUTPATIENT)
Dept: CARDIOLOGY | Facility: HOSPITAL | Age: 73
Discharge: HOME | End: 2018-01-23
Attending: INTERNAL MEDICINE | Admitting: INTERNAL MEDICINE

## 2018-01-23 VITALS
HEIGHT: 66 IN | WEIGHT: 172 LBS | DIASTOLIC BLOOD PRESSURE: 75 MMHG | SYSTOLIC BLOOD PRESSURE: 148 MMHG | BODY MASS INDEX: 27.64 KG/M2

## 2018-01-23 VITALS
BODY MASS INDEX: 27.64 KG/M2 | DIASTOLIC BLOOD PRESSURE: 90 MMHG | WEIGHT: 172 LBS | HEIGHT: 66 IN | HEART RATE: 52 BPM | SYSTOLIC BLOOD PRESSURE: 190 MMHG

## 2018-01-23 DIAGNOSIS — R06.02 SHORTNESS OF BREATH: ICD-10-CM

## 2018-01-23 LAB
BH CV ECHO MEAS - AI DEC SLOPE: 130.3 CM/SEC^2
BH CV ECHO MEAS - AI MAX PG: 53.7 MMHG
BH CV ECHO MEAS - AI MAX VEL: 366.5 CM/SEC
BH CV ECHO MEAS - AI P1/2T: 823.5 MSEC
BH CV ECHO MEAS - AO ROOT AREA (BSA CORRECTED): 1.6
BH CV ECHO MEAS - AO ROOT AREA: 6.8 CM^2
BH CV ECHO MEAS - AO ROOT DIAM: 2.9 CM
BH CV ECHO MEAS - ASC AORTA: 2.7 CM
BH CV ECHO MEAS - BSA(HAYCOCK): 1.9 M^2
BH CV ECHO MEAS - BSA: 1.9 M^2
BH CV ECHO MEAS - BZI_BMI: 27.8 KILOGRAMS/M^2
BH CV ECHO MEAS - BZI_METRIC_HEIGHT: 167.6 CM
BH CV ECHO MEAS - BZI_METRIC_WEIGHT: 78 KG
BH CV ECHO MEAS - CONTRAST EF (2CH): 67.3 ML/M^2
BH CV ECHO MEAS - CONTRAST EF 4CH: 57.4 ML/M^2
BH CV ECHO MEAS - EDV(CUBED): 106.1 ML
BH CV ECHO MEAS - EDV(MOD-SP2): 101 ML
BH CV ECHO MEAS - EDV(MOD-SP4): 68 ML
BH CV ECHO MEAS - EDV(TEICH): 104.1 ML
BH CV ECHO MEAS - EF(CUBED): 75.4 %
BH CV ECHO MEAS - EF(MOD-SP2): 67.3 %
BH CV ECHO MEAS - EF(MOD-SP4): 57.4 %
BH CV ECHO MEAS - EF(TEICH): 67.3 %
BH CV ECHO MEAS - ESV(CUBED): 26.1 ML
BH CV ECHO MEAS - ESV(MOD-SP2): 33 ML
BH CV ECHO MEAS - ESV(MOD-SP4): 29 ML
BH CV ECHO MEAS - ESV(TEICH): 34 ML
BH CV ECHO MEAS - FS: 37.4 %
BH CV ECHO MEAS - IVS/LVPW: 1.1
BH CV ECHO MEAS - IVSD: 1.5 CM
BH CV ECHO MEAS - LAT PEAK E' VEL: 9.8 CM/SEC
BH CV ECHO MEAS - LV DIASTOLIC VOL/BSA (35-75): 36.2 ML/M^2
BH CV ECHO MEAS - LV MASS(C)D: 287.1 GRAMS
BH CV ECHO MEAS - LV MASS(C)DI: 153 GRAMS/M^2
BH CV ECHO MEAS - LV SYSTOLIC VOL/BSA (12-30): 15.5 ML/M^2
BH CV ECHO MEAS - LVIDD: 4.7 CM
BH CV ECHO MEAS - LVIDS: 3 CM
BH CV ECHO MEAS - LVLD AP2: 8.1 CM
BH CV ECHO MEAS - LVLD AP4: 6.6 CM
BH CV ECHO MEAS - LVLS AP2: 5.5 CM
BH CV ECHO MEAS - LVLS AP4: 4.9 CM
BH CV ECHO MEAS - LVOT AREA (M): 3.5 CM^2
BH CV ECHO MEAS - LVOT AREA: 3.3 CM^2
BH CV ECHO MEAS - LVOT DIAM: 2.1 CM
BH CV ECHO MEAS - LVPWD: 1.4 CM
BH CV ECHO MEAS - MED PEAK E' VEL: 7.9 CM/SEC
BH CV ECHO MEAS - MV A MAX VEL: 69.6 CM/SEC
BH CV ECHO MEAS - MV DEC TIME: 0.28 SEC
BH CV ECHO MEAS - MV E MAX VEL: 85.9 CM/SEC
BH CV ECHO MEAS - MV E/A: 1.2
BH CV ECHO MEAS - PA ACC SLOPE: 611.9 CM/SEC^2
BH CV ECHO MEAS - PA ACC TIME: 0.15 SEC
BH CV ECHO MEAS - PA MAX PG: 6.3 MMHG
BH CV ECHO MEAS - PA PR(ACCEL): 11.7 MMHG
BH CV ECHO MEAS - PA V2 MAX: 125.4 CM/SEC
BH CV ECHO MEAS - SI(CUBED): 42.6 ML/M^2
BH CV ECHO MEAS - SI(MOD-SP2): 36.2 ML/M^2
BH CV ECHO MEAS - SI(MOD-SP4): 20.8 ML/M^2
BH CV ECHO MEAS - SI(TEICH): 37.3 ML/M^2
BH CV ECHO MEAS - SV(CUBED): 80 ML
BH CV ECHO MEAS - SV(MOD-SP2): 68 ML
BH CV ECHO MEAS - SV(MOD-SP4): 39 ML
BH CV ECHO MEAS - SV(TEICH): 70.1 ML
BH CV ECHO MEAS - TAPSE (>1.6): 2.78 CM2
BH CV STRESS BP STAGE 1: NORMAL
BH CV STRESS BP STAGE 2: NORMAL
BH CV STRESS DURATION MIN STAGE 1: 3
BH CV STRESS DURATION MIN STAGE 2: 3
BH CV STRESS DURATION MIN STAGE 3: 2
BH CV STRESS DURATION SEC STAGE 1: 0
BH CV STRESS DURATION SEC STAGE 2: 0
BH CV STRESS DURATION SEC STAGE 3: 51
BH CV STRESS GRADE STAGE 1: 10
BH CV STRESS GRADE STAGE 2: 12
BH CV STRESS GRADE STAGE 3: 14
BH CV STRESS HR STAGE 1: 91
BH CV STRESS HR STAGE 2: 106
BH CV STRESS HR STAGE 3: 122
BH CV STRESS METS STAGE 1: 5
BH CV STRESS METS STAGE 2: 7.5
BH CV STRESS METS STAGE 3: 10
BH CV STRESS O2 STAGE 1: 95
BH CV STRESS O2 STAGE 2: 96
BH CV STRESS O2 STAGE 3: 97
BH CV STRESS PROTOCOL 1: NORMAL
BH CV STRESS RECOVERY BP: NORMAL MMHG
BH CV STRESS RECOVERY HR: 78 BPM
BH CV STRESS SPEED STAGE 1: 1.7
BH CV STRESS SPEED STAGE 2: 2.5
BH CV STRESS SPEED STAGE 3: 3.4
BH CV STRESS STAGE 1: 1
BH CV STRESS STAGE 2: 2
BH CV STRESS STAGE 3: 3
BH CV XLRA - RV BASE: 2.6 CM
BH CV XLRA - RV LENGTH: 4.4 CM
BH CV XLRA - RV MID: 2.1 CM
BH CV XLRA - TDI S': 21.5 CM/SEC
E/E' RATIO: 8.7
LEFT ATRIUM VOLUME INDEX: 20.2 ML/M2
LV EF 2D ECHO EST: 60 %
LV EF NUC BP: 57 %
MAXIMAL PREDICTED HEART RATE: 147 BPM
MAXIMAL PREDICTED HEART RATE: 147 BPM
PERCENT MAX PREDICTED HR: 82.99 %
STRESS BASELINE BP: NORMAL MMHG
STRESS BASELINE HR: 48 BPM
STRESS PERCENT HR: 98 %
STRESS POST ESTIMATED WORKLOAD: 10.4 METS
STRESS POST EXERCISE DUR MIN: 8 MIN
STRESS POST EXERCISE DUR SEC: 51 SEC
STRESS POST PEAK BP: NORMAL MMHG
STRESS POST PEAK HR: 122 BPM
STRESS TARGET HR: 125 BPM
STRESS TARGET HR: 125 BPM

## 2018-01-23 PROCEDURE — 93018 CV STRESS TEST I&R ONLY: CPT | Performed by: INTERNAL MEDICINE

## 2018-01-23 PROCEDURE — 93017 CV STRESS TEST TRACING ONLY: CPT

## 2018-01-23 PROCEDURE — 93306 TTE W/DOPPLER COMPLETE: CPT

## 2018-01-23 PROCEDURE — A9500 TC99M SESTAMIBI: HCPCS | Performed by: INTERNAL MEDICINE

## 2018-01-23 PROCEDURE — 78452 HT MUSCLE IMAGE SPECT MULT: CPT | Performed by: INTERNAL MEDICINE

## 2018-01-23 PROCEDURE — 0 TECHNETIUM SESTAMIBI: Performed by: INTERNAL MEDICINE

## 2018-01-23 PROCEDURE — 93306 TTE W/DOPPLER COMPLETE: CPT | Performed by: INTERNAL MEDICINE

## 2018-01-23 PROCEDURE — 78452 HT MUSCLE IMAGE SPECT MULT: CPT

## 2018-01-23 RX ADMIN — TECHNETIUM TC 99M SESTAMIBI 1 DOSE: 1 INJECTION INTRAVENOUS at 11:50

## 2018-01-23 RX ADMIN — TECHNETIUM TC 99M SESTAMIBI 1 DOSE: 1 INJECTION INTRAVENOUS at 10:10

## 2018-06-08 ENCOUNTER — TRANSCRIBE ORDERS (OUTPATIENT)
Dept: ADMINISTRATIVE | Facility: HOSPITAL | Age: 73
End: 2018-06-08

## 2018-06-08 DIAGNOSIS — M25.561 ACUTE PAIN OF RIGHT KNEE: Primary | ICD-10-CM

## 2018-06-11 ENCOUNTER — HOSPITAL ENCOUNTER (OUTPATIENT)
Dept: MRI IMAGING | Facility: HOSPITAL | Age: 73
Discharge: HOME | End: 2018-06-11
Admitting: NURSE PRACTITIONER

## 2018-06-11 DIAGNOSIS — M25.561 ACUTE PAIN OF RIGHT KNEE: ICD-10-CM

## 2018-06-11 PROCEDURE — 73721 MRI JNT OF LWR EXTRE W/O DYE: CPT

## 2018-06-13 ENCOUNTER — OFFICE VISIT (OUTPATIENT)
Dept: ORTHOPEDIC SURGERY | Facility: CLINIC | Age: 73
End: 2018-06-13

## 2018-06-13 VITALS — BODY MASS INDEX: 26.5 KG/M2 | OXYGEN SATURATION: 89 % | HEART RATE: 42 BPM | HEIGHT: 66 IN | WEIGHT: 164.9 LBS

## 2018-06-13 DIAGNOSIS — M17.11 PRIMARY OSTEOARTHRITIS OF RIGHT KNEE: Primary | ICD-10-CM

## 2018-06-13 PROCEDURE — 99203 OFFICE O/P NEW LOW 30 MIN: CPT | Performed by: ORTHOPAEDIC SURGERY

## 2018-06-13 PROCEDURE — 20610 DRAIN/INJ JOINT/BURSA W/O US: CPT | Performed by: ORTHOPAEDIC SURGERY

## 2018-06-13 RX ORDER — TRIAMCINOLONE ACETONIDE 40 MG/ML
40 INJECTION, SUSPENSION INTRA-ARTICULAR; INTRAMUSCULAR
Status: COMPLETED | OUTPATIENT
Start: 2018-06-13 | End: 2018-06-13

## 2018-06-13 RX ORDER — ROPIVACAINE HYDROCHLORIDE 5 MG/ML
4 INJECTION, SOLUTION EPIDURAL; INFILTRATION; PERINEURAL
Status: COMPLETED | OUTPATIENT
Start: 2018-06-13 | End: 2018-06-13

## 2018-06-13 RX ADMIN — TRIAMCINOLONE ACETONIDE 40 MG: 40 INJECTION, SUSPENSION INTRA-ARTICULAR; INTRAMUSCULAR at 13:59

## 2018-06-13 RX ADMIN — ROPIVACAINE HYDROCHLORIDE 4 ML: 5 INJECTION, SOLUTION EPIDURAL; INFILTRATION; PERINEURAL at 13:59

## 2018-06-13 NOTE — PROGRESS NOTES
Procedure   Large Joint Arthrocentesis  Date/Time: 6/13/2018 1:59 PM  Consent given by: patient  Site marked: site marked  Timeout: Immediately prior to procedure a time out was called to verify the correct patient, procedure, equipment, support staff and site/side marked as required   Supporting Documentation  Indications: pain   Procedure Details  Location: knee - R knee  Preparation: Patient was prepped and draped in the usual sterile fashion  Needle size: 22 G  Approach: anterolateral  Medications administered: 40 mg triamcinolone acetonide 40 MG/ML; 4 mL ropivacaine 0.5 %  Patient tolerance: patient tolerated the procedure well with no immediate complications

## 2018-06-13 NOTE — PROGRESS NOTES
[facility] [place] [facility] [place]    Subjective     Chief Complaint   Patient presents with   • Right Knee - Pain     Right Knee Pain        HPI    Hayden Farah is a 72 y.o. male. He presents today for evaluation of right knee pain.  He's had pain now for 3 weeks, following an injury when he was at home, stepped off a ladder, and had the onset of pain in his knee afterwards.  He has known history of some arthritis in the knee.  The pain is severe, aching and sharp in quality, associated with swelling and stiffness.  It worsens with walking and climbing stairs.  He is using a cane.      Patient Active Problem List   Diagnosis   • Hypokalemia   • SBO (small bowel obstruction)   • HTN (hypertension)   • H/O prostate cancer     Past Medical History:   Diagnosis Date   • Cancer     prostate cancer   • Celiac disease    • Headache    • Hypertension    • Pneumonia     Pt reports he was 32, in hospital for weeks.       Past Surgical History:   Procedure Laterality Date   • CHOLECYSTECTOMY     • PROSTATECTOMY     • SHOULDER SURGERY Left 20 years ago      History reviewed. No pertinent family history.  Social History     Social History   • Marital status:      Spouse name: N/A   • Number of children: N/A   • Years of education: N/A     Occupational History   • Not on file.     Social History Main Topics   • Smoking status: Never Smoker   • Smokeless tobacco: Never Used   • Alcohol use No   • Drug use: No   • Sexual activity: Not on file     Other Topics Concern   • Not on file     Social History Narrative   • No narrative on file      Current Outpatient Prescriptions on File Prior to Visit   Medication Sig Dispense Refill   • atenolol (TENORMIN) 100 MG tablet Take  by mouth Every Night. Unsure of dose      • buPROPion (WELLBUTRIN) 75 MG tablet Take  by mouth Every Night. Unsure of dose      • busPIRone (BUSPAR) 5 MG tablet Take 1 tablet by mouth Every 12 (Twelve) Hours. 60 tablet 0   • hydrochlorothiazide  (MICROZIDE) 12.5 MG capsule Take  by mouth Daily. Unsure of dose     • lisinopril (PRINIVIL,ZESTRIL) 10 MG tablet Take  by mouth Every Night. Unsure of dose      • pantoprazole (PROTONIX) 20 MG EC tablet Take  by mouth Daily. Unsure of dose       No current facility-administered medications on file prior to visit.       Allergies   Allergen Reactions   • Sulfa Antibiotics         Review of Systems   Constitutional: Positive for fatigue. Negative for activity change, appetite change, chills, fever and unexpected weight change.   HENT: Positive for congestion and sore throat. Negative for dental problem, drooling, ear discharge, ear pain, facial swelling, hearing loss, mouth sores, nosebleeds, postnasal drip, rhinorrhea, sinus pressure, sneezing, tinnitus, trouble swallowing and voice change.    Eyes: Positive for pain and discharge. Negative for photophobia, redness, itching and visual disturbance.   Respiratory: Negative for apnea, cough, choking, chest tightness, shortness of breath, wheezing and stridor.    Cardiovascular: Negative for chest pain, palpitations and leg swelling.   Gastrointestinal: Negative for abdominal distention, abdominal pain, anal bleeding, blood in stool, constipation, diarrhea, nausea, rectal pain and vomiting.   Endocrine: Positive for cold intolerance. Negative for heat intolerance, polydipsia, polyphagia and polyuria.   Genitourinary: Positive for frequency and urgency. Negative for decreased urine volume, difficulty urinating, dysuria, enuresis, flank pain, genital sores and hematuria.   Musculoskeletal: Positive for joint swelling. Negative for arthralgias, back pain, gait problem, myalgias, neck pain and neck stiffness.        Joint Pain    Skin: Positive for color change. Negative for pallor, rash and wound.   Allergic/Immunologic: Negative for environmental allergies, food allergies and immunocompromised state.   Neurological: Positive for headaches. Negative for dizziness, tremors,  "seizures, syncope, facial asymmetry, speech difficulty, weakness, light-headedness and numbness.   Hematological: Negative for adenopathy. Does not bruise/bleed easily.   Psychiatric/Behavioral: Positive for decreased concentration. Negative for agitation, behavioral problems, confusion, dysphoric mood, hallucinations, self-injury, sleep disturbance and suicidal ideas. The patient is nervous/anxious. The patient is not hyperactive.         Objective      Physical Exam  Pulse (!) 42   Ht 167 cm (65.75\")   Wt 74.8 kg (164 lb 14.5 oz)   SpO2 (!) 89%   BMI 26.82 kg/m²     Body mass index is 26.82 kg/m².    General:   Mental Status:  Alert   Appearance: Cooperative, in no acute distress   Build and Nutrition: Well-nourished and well developed male   Orientation: Alert and oriented to person, place and time   Posture: Normal   Gait: Mildly antalgic with a cane    Integument:   Right knee: No skin lesions, no rash, no ecchymosis    Neurologic:   Sensation:    Right foot: Intact to light touch on the dorsal and plantar aspect   Motor:  Right lower extremity: 5/5 quadriceps, hamstrings, ankle dorsiflexors, and ankle plantar flexors    Vascular:   Right lower extremity: 2+ dorsalis pedis pulse, prompt capillary refill    Lower Extremities:   Right Knee:    Tenderness:  Medial joint line tenderness    Effusion:  None    Swelling:  None    Atrophy:  None    Range of motion:  Extension: 0°       Flexion: 120°  Instability:  No varus laxity, no valgus laxity, negative anterior drawer  Deformities:  None        Imaging/Studies  Imaging Results (last 24 hours)     Procedure Component Value Units Date/Time    XR Knee 4+ View Right [[id number]] Resulted:  06/13/18 1352     Updated:  06/13/18 1353    Narrative:       Right Knee Radiographs  Indication: right knee pain  Views: Standing AP's and skiers of both knees, with lateral and sunrise   views of the right knee    Comparison: no prior studies available    Findings:   Arthritic " changes are seen, with near bone-on-bone contact in medial   compartment, with tricompartmental osteophytes.    Impression: Right knee osteoarthritis        MRI right knee:  IMPRESSION:     1. Age-indeterminate complex tear posterior horn lateral meniscus.  2. Medial and patellofemoral compartmental arthrosis.  3. No ligamentous injury.     D:  06/11/2018  E:  06/11/2018     This report was finalized on 6/11/2018 12:56 PM by Hank Malcolm.    To my reading, he also has edema in the medial tibial plateau, and degeneration in the medial meniscus, consistent with arthritic changes.    Assessment and Plan     Hayden was seen today for pain.    Diagnoses and all orders for this visit:    Primary osteoarthritis of right knee  -     XR Knee 4+ View Right  -     Large Joint Arthrocentesis        I reviewed my findings with patient today.  I believe he has an arthritic flare in his right knee, and offered him an intra-articular injection today.  He wished proceed, and the injection was provided.  I will see him back in 2 months, but sooner for any problems.  He is relocating to [place] sometime in the near future.    Of note, he had about 50% relief just a few minutes following the injection.    Return in about 2 months (around 8/13/2018), or if symptoms worsen or fail to improve.      Medical Decision Making  Management Options : prescription/IM medicine  Data/Risk: radiology tests and independent visualization of imaging, lab tests, or EMG/NCV      Dallas Lynn MD  06/13/18  2:09 PM

## 2018-06-29 ENCOUNTER — TELEPHONE (OUTPATIENT)
Dept: ORTHOPEDIC SURGERY | Facility: CLINIC | Age: 73
End: 2018-06-29

## 2018-06-29 NOTE — TELEPHONE ENCOUNTER
PT HAD AN INJECTION EARLIER THIS MONTH. HE SAID IT IS NOT HELPING. IS THERE ANYTHING ELSE HE COULD DO? HIS NUMBER -[phone]. PT IS OUT OF TOWN FROM 7/9/18-7/11/18.

## 2018-06-29 NOTE — TELEPHONE ENCOUNTER
He can try a medial  brace - please let the brace folks know - and he can come in for a fitting.  Otherwise, the only other option is surgery.

## 2018-06-29 NOTE — TELEPHONE ENCOUNTER
Patient had a cortisone injection in his right knee on 06/13/18. He states it hasn't help his knee pain and would like to know what he can do about his knee pain. He would like to know if you would recommend a brace for the knee and what kind if so. Dr. Lynn, please advise.

## 2018-06-29 NOTE — TELEPHONE ENCOUNTER
PT RETURNED CALL. PLEASE CALL HIM BACK.    SUBJECTIVE:  44 year old.The patient has a history of right lower quadrant pain.  This started one year ago. Comes and goes  quality feeling like a testicular pain Associated symptoms are radiation to right testicle.   .  Better with time. ROS  No fever chills blood in urine    Reviewed health maintenance  Patient Active Problem List   Diagnosis     CARDIOVASCULAR SCREENING; LDL GOAL LESS THAN 160     Back strain     Sebaceous cyst     Lung nodule     Chest pain     Gastroesophageal reflux disease without esophagitis     Fatty liver     Past Medical History:   Diagnosis Date     Headache      Palpitation        OBJECTIVE:  no apparent distress  /85  Pulse 67  Temp 98  F (36.7  C) (Oral)  Wt 170 lb 3.2 oz (77.2 kg)  BMI 25.13 kg/m2  tender right epidiymis  No inguinal hernias    LUNGS:  CTA B/L, no wheezing or crackles.   Cardiovascular: negative, PMI normal. No lifts, heaves, or thrills. RRR. No murmurs, clicks gallops or rub   Gastrointestinal: Abdomen soft, non-tender. BS normal. No masses, organomegaly   No components found for: URINE   Ultrasound possible congestion of epididymus     ICD-10-CM    1. Epididymitis N45.1 ciprofloxacin (CIPRO) 500 MG tablet     pseudoePHEDrine (SUDAFED) 60 MG tablet    PLAN: Re check 2 weeks

## 2018-06-29 NOTE — TELEPHONE ENCOUNTER
Informed patient of this and will let [facility] know he will be either coming in on Monday or have them call him.

## 2018-07-02 ENCOUNTER — TELEPHONE (OUTPATIENT)
Dept: ORTHOPEDIC SURGERY | Facility: CLINIC | Age: 73
End: 2018-07-02

## 2018-08-15 ENCOUNTER — OFFICE VISIT (OUTPATIENT)
Dept: ORTHOPEDIC SURGERY | Facility: CLINIC | Age: 73
End: 2018-08-15

## 2018-08-15 VITALS — BODY MASS INDEX: 26.57 KG/M2 | WEIGHT: 165.34 LBS | OXYGEN SATURATION: 98 % | HEART RATE: 52 BPM | HEIGHT: 66 IN

## 2018-08-15 DIAGNOSIS — M17.11 PRIMARY OSTEOARTHRITIS OF RIGHT KNEE: Primary | ICD-10-CM

## 2018-08-15 PROCEDURE — 99212 OFFICE O/P EST SF 10 MIN: CPT | Performed by: ORTHOPAEDIC SURGERY

## 2018-08-15 NOTE — PROGRESS NOTES
[facility] [place] [facility] [place]    Subjective     Chief Complaint   Patient presents with   • Right Knee - Follow-up     2 month follow up         HPI    Hayden Farah is a 72 y.o. male.  He follows up today for his right knee.  No new complaints today.  He is improved after the injection and the brace.  Currently only having mild pain.  Ambulatory without external aids.      Patient Active Problem List   Diagnosis   • Hypokalemia   • SBO (small bowel obstruction)   • HTN (hypertension)   • H/O prostate cancer     Past Medical History:   Diagnosis Date   • Cancer (CMS/HCC)     prostate cancer   • Celiac disease    • Headache    • Hypertension    • Pneumonia     Pt reports he was 32, in hospital for weeks.       Past Surgical History:   Procedure Laterality Date   • CHOLECYSTECTOMY     • PROSTATECTOMY     • SHOULDER SURGERY Left 20 years ago      History reviewed. No pertinent family history.  Social History     Social History   • Marital status:      Spouse name: N/A   • Number of children: N/A   • Years of education: N/A     Occupational History   • Not on file.     Social History Main Topics   • Smoking status: Never Smoker   • Smokeless tobacco: Never Used   • Alcohol use No   • Drug use: No   • Sexual activity: Not on file     Other Topics Concern   • Not on file     Social History Narrative   • No narrative on file      Current Outpatient Prescriptions on File Prior to Visit   Medication Sig Dispense Refill   • atenolol (TENORMIN) 100 MG tablet Take  by mouth Every Night. Unsure of dose      • buPROPion (WELLBUTRIN) 75 MG tablet Take  by mouth Every Night. Unsure of dose      • busPIRone (BUSPAR) 5 MG tablet Take 1 tablet by mouth Every 12 (Twelve) Hours. 60 tablet 0   • hydrochlorothiazide (MICROZIDE) 12.5 MG capsule Take  by mouth Daily. Unsure of dose     • lisinopril (PRINIVIL,ZESTRIL) 10 MG tablet Take  by mouth Every Night. Unsure of dose      • pantoprazole (PROTONIX) 20 MG EC tablet  Take  by mouth Daily. Unsure of dose       No current facility-administered medications on file prior to visit.       Allergies   Allergen Reactions   • Sulfa Antibiotics         Review of Systems   Constitutional: Negative for activity change, appetite change, chills, diaphoresis, fatigue, fever and unexpected weight change.   HENT: Negative for congestion, dental problem, drooling, ear discharge, ear pain, facial swelling, hearing loss, mouth sores, nosebleeds, postnasal drip, rhinorrhea, sinus pressure, sneezing, sore throat, tinnitus, trouble swallowing and voice change.    Eyes: Negative for photophobia, pain, discharge, redness, itching and visual disturbance.   Respiratory: Negative for apnea, cough, choking, chest tightness, shortness of breath, wheezing and stridor.    Cardiovascular: Negative for chest pain, palpitations and leg swelling.   Gastrointestinal: Negative for abdominal distention, abdominal pain, anal bleeding, blood in stool, constipation, diarrhea, nausea, rectal pain and vomiting.   Endocrine: Negative for cold intolerance, heat intolerance, polydipsia, polyphagia and polyuria.   Genitourinary: Negative for decreased urine volume, difficulty urinating, dysuria, enuresis, flank pain, frequency, genital sores, hematuria and urgency.   Musculoskeletal: Positive for joint swelling. Negative for arthralgias, back pain, gait problem, myalgias, neck pain and neck stiffness.   Skin: Negative for color change, pallor, rash and wound.   Allergic/Immunologic: Negative for environmental allergies, food allergies and immunocompromised state.   Neurological: Negative for dizziness, tremors, seizures, syncope, facial asymmetry, speech difficulty, weakness, light-headedness, numbness and headaches.   Hematological: Negative for adenopathy. Does not bruise/bleed easily.   Psychiatric/Behavioral: Negative for agitation, behavioral problems, confusion, decreased concentration, dysphoric mood, hallucinations,  "self-injury, sleep disturbance and suicidal ideas. The patient is not nervous/anxious and is not hyperactive.         Objective      Physical Exam  Pulse 52   Ht 167 cm (65.75\")   Wt 75 kg (165 lb 5.5 oz)   SpO2 98%   BMI 26.89 kg/m²     Body mass index is 26.89 kg/m².    General:   Mental Status:  Alert   Appearance: Cooperative, in no acute distress   Build and Nutrition: Well-nourished and well developed male   Orientation: Alert and oriented to person, place and time   Posture: Normal   Gait: Normal    Integument:   Right knee: No skin lesions, no rash, no ecchymosis    Lower Extremities:   Right Knee:    Tenderness:  Mild medial joint line tenderness    Effusion:  None    Swelling: None    Crepitus:  Positive    Range of motion:  Extension: 0°       Flexion: 120°  Instability:  No varus laxity, no valgus laxity, negative anterior drawer  Deformities:  None          Assessment and Plan     Hayden was seen today for follow-up.    Diagnoses and all orders for this visit:    Primary osteoarthritis of right knee  -     Ambulatory Referral to Physical Therapy Evaluate and treat        His right knee is doing well today.  He will continue with conservative treatment, and I will see him back in 6 months.  He would like to try some physical therapy, and a referral was provided.    Return in about 6 months (around 2/15/2019).      Medical Decision Making  Management Options : physical/occupational therapy      Dallas Lynn MD  08/15/18  10:17 AM  "

## 2018-08-20 ENCOUNTER — HOSPITAL ENCOUNTER (OUTPATIENT)
Dept: PHYSICAL THERAPY | Facility: HOSPITAL | Age: 73
Setting detail: THERAPIES SERIES
Discharge: HOME | End: 2018-08-20

## 2018-08-20 DIAGNOSIS — M17.11 PRIMARY OSTEOARTHRITIS OF RIGHT KNEE: Primary | ICD-10-CM

## 2018-08-20 PROCEDURE — G8979 MOBILITY GOAL STATUS: HCPCS | Performed by: PHYSICAL THERAPIST

## 2018-08-20 PROCEDURE — 97161 PT EVAL LOW COMPLEX 20 MIN: CPT | Performed by: PHYSICAL THERAPIST

## 2018-08-20 PROCEDURE — G8978 MOBILITY CURRENT STATUS: HCPCS | Performed by: PHYSICAL THERAPIST

## 2018-08-23 ENCOUNTER — HOSPITAL ENCOUNTER (OUTPATIENT)
Dept: PHYSICAL THERAPY | Facility: HOSPITAL | Age: 73
Setting detail: THERAPIES SERIES
Discharge: HOME | End: 2018-08-23

## 2018-08-23 DIAGNOSIS — M17.11 PRIMARY OSTEOARTHRITIS OF RIGHT KNEE: Primary | ICD-10-CM

## 2018-08-23 PROCEDURE — 97110 THERAPEUTIC EXERCISES: CPT | Performed by: PHYSICAL THERAPIST

## 2018-08-23 NOTE — THERAPY TREATMENT NOTE
Outpatient Physical Therapy Ortho Treatment Note  [facility]     Patient Name: Hayden Farah  : 1945  MRN: [id number]  Today's Date: 2018      Visit Date: 2018    Visit Dx:    ICD-10-CM ICD-9-CM   1. Primary osteoarthritis of right knee M17.11 715.16       Patient Active Problem List   Diagnosis   • Hypokalemia   • SBO (small bowel obstruction)   • HTN (hypertension)   • H/O prostate cancer        Past Medical History:   Diagnosis Date   • Cancer (CMS/HCC)     prostate cancer   • Celiac disease    • Headache    • Hypertension    • Pneumonia     Pt reports he was 32, in hospital for weeks.         Past Surgical History:   Procedure Laterality Date   • CHOLECYSTECTOMY     • PROSTATECTOMY     • SHOULDER SURGERY Left 20 years ago                             PT Assessment/Plan     Row Name 18 0895          PT Assessment    Assessment Comments Pt educated on HEP, given written copy and BTB after demonstrating understanding in clinic. Pain with end range knee extension. Trial of estim used for pain mgmt and pt education of home options.   -CP        PT Plan    PT Plan Comments Assess compliance with HEP. Progress in clinic with standing CKC ther ex as tolerated.   -CP       User Key  (r) = Recorded By, (t) = Taken By, (c) = Cosigned By    Initials Name Provider Type    CP Perkins, Corinne E, PT Physical Therapist                Modalities     Row Name 18 0800             ELECTRICAL STIMULATION    Attended/Unattended Unattended   pt in supported sitting position  -CP      Stimulation Type --   EMPI tens unit  -CP      Location/Electrode Placement/Other right knee complex; 2 channels  -CP      43145 - PT Electrical Stimulation (Manual) Minutes 8  -CP        User Key  (r) = Recorded By, (t) = Taken By, (c) = Cosigned By    Initials Name Provider Type    CP Perkins, Corinne E, PT Physical Therapist                Exercises     Row Name 18 0800             Subjective Comments     Subjective Comments Pt states he was active yesterday, doing yardwork for 3-4 hours and walking dogs multiple times. On arrival,, states his right knee is more sore.   -CP         Subjective Pain    Able to rate subjective pain? yes  -CP      Pre-Treatment Pain Level 3   right knee 3/10, Left knee 1/10  -CP      Post-Treatment Pain Level 2  -CP         Total Minutes    65564 - PT Therapeutic Exercise Minutes 38  -CP      75291 - PT Manual Therapy Minutes 2  -CP         Exercise 1    Exercise Name 1 Nustep Level 5  -CP      Time 1 5  -CP         Exercise 2    Exercise Name 2 Seated knee flexion/extension  -CP      Cueing 2 Verbal;Demo  -CP      Time 2 1  -CP      Additional Comments plate slider  -CP         Exercise 3    Exercise Name 3 Quad sets, glute sets   -CP      Cueing 3 Verbal  -CP      Reps 3 10x each  -CP         Exercise 4    Exercise Name 4 Bridges  -CP      Cueing 4 Verbal  -CP      Reps 4 10x   -CP         Exercise 5    Exercise Name 5 SLR  -CP      Cueing 5 Verbal;Tactile  -CP      Reps 5 10x each  -CP         Exercise 6    Exercise Name 6 gastroc stretch, hamstring stretch  -CP      Reps 6 4x each  -CP         Exercise 7    Exercise Name 7 hip abd clams  -CP      Cueing 7 Verbal  -CP      Reps 7 10x each from sidelying position  -CP      Additional Comments BTB above knees  -CP         Exercise 8    Exercise Name 8 LAQ/hamstring curl with BTB  -CP      Cueing 8 Verbal  -CP      Reps 8 10x each   -CP        User Key  (r) = Recorded By, (t) = Taken By, (c) = Cosigned By    Initials Name Provider Type    CP Perkins, Corinne E, PT Physical Therapist                        Manual Rx (last 36 hours)      Manual Treatments     Row Name 08/23/18 0800             Total Minutes    00514 - PT Manual Therapy Minutes 2  -CP         Manual Rx 1    Manual Rx 1 Location right knee  -CP      Manual Rx 1 Type patellar mobs for improved mobility/assessment  -CP      Manual Rx 1 Duration 2  -CP        User Key  (r) =  Recorded By, (t) = Taken By, (c) = Cosigned By    Initials Name Provider Type    CP Perkins, Corinne E, PT Physical Therapist              Therapy Education  Education Details: HEP: gastroc stretch, hamstring stretch, quad/glute set, SLR, hip abd clams with BTB, and seated knee AROM. BTB given for home use.   Given: HEP  Program: New  How Provided: Verbal, Demonstration, Written  Provided to: Patient  Level of Understanding: Verbalized, Demonstrated              Time Calculation:   Start Time: 0847  Total Timed Code Minutes- PT: 40 minute(s)  Therapy Suggested Charges     Code   Minutes Charges    48534 (CPT®) Hc Pt Neuromusc Re Education Ea 15 Min      11301 (CPT®) Hc Pt Ther Proc Ea 15 Min 38 3    89914 (CPT®) Hc Gait Training Ea 15 Min      25587 (CPT®) Hc Pt Therapeutic Act Ea 15 Min      37499 (CPT®) Hc Pt Manual Therapy Ea 15 Min 2     27644 (CPT®) Hc Pt Ther Massage- Per 15 Min      56570 (CPT®) Hc Pt Iontophoresis Ea 15 Min      33411 (CPT®) Hc Pt Elec Stim Ea-Per 15 Min 8     63863 (CPT®) Hc Pt Ultrasound Ea 15 Min      68525 (CPT®) Hc Pt Self Care/Mgmt/Train Ea 15 Min      73190 (CPT®) Hc Pt Prosthetic (S) Train Initial Encounter, Each 15 Min      34293 (CPT®) Hc Orthotic(S) Mgmt/Train Initial Encounter, Each 15min      10122 (CPT®) Hc Pt Aquatic Therapy Ea 15 Min      90459 (CPT®) Hc Pt Orthotic(S)/Prosthetic(S) Encounter, Each 15 Min      Total  48 3        Therapy Charges for Today     Code Description Service Date Service Provider Modifiers Qty    28544249511 HC PT THER PROC EA 15 MIN 8/23/2018 Perkins, Corinne E, PT GP 3                    Corinne E. Perkins, PT  8/23/2018

## 2018-08-27 ENCOUNTER — HOSPITAL ENCOUNTER (OUTPATIENT)
Dept: PHYSICAL THERAPY | Facility: HOSPITAL | Age: 73
Setting detail: THERAPIES SERIES
Discharge: HOME | End: 2018-08-27

## 2018-08-27 DIAGNOSIS — M17.11 PRIMARY OSTEOARTHRITIS OF RIGHT KNEE: Primary | ICD-10-CM

## 2018-08-27 PROCEDURE — 97110 THERAPEUTIC EXERCISES: CPT | Performed by: PHYSICAL THERAPIST

## 2018-08-27 NOTE — THERAPY TREATMENT NOTE
Outpatient Physical Therapy Ortho Treatment Note  [facility]     Patient Name: Hayden Farah  : 1945  MRN: [id number]  Today's Date: 2018      Visit Date: 2018    Visit Dx:    ICD-10-CM ICD-9-CM   1. Primary osteoarthritis of right knee M17.11 715.16       Patient Active Problem List   Diagnosis   • Hypokalemia   • SBO (small bowel obstruction)   • HTN (hypertension)   • H/O prostate cancer        Past Medical History:   Diagnosis Date   • Cancer (CMS/HCC)     prostate cancer   • Celiac disease    • Headache    • Hypertension    • Pneumonia     Pt reports he was 32, in hospital for weeks.         Past Surgical History:   Procedure Laterality Date   • CHOLECYSTECTOMY     • PROSTATECTOMY     • SHOULDER SURGERY Left 20 years ago                             PT Assessment/Plan     Row Name 18 1400          PT Assessment    Assessment Comments Pt tolerated increased standing CKC position ther exercises, R knee pain remained 2/10. Increased time spent with pt education regarding POC progression, rehab post-op, and review of current HEP. Pt has f/u with Dr. Lynn Wed.   -CP        PT Plan    PT Plan Comments F/u after MD appt. Progress in clinic with standing ther ex, strengthening ex as tolerated.   -CP       User Key  (r) = Recorded By, (t) = Taken By, (c) = Cosigned By    Initials Name Provider Type    CP Perkins, Corinne E, PT Physical Therapist                    Exercises     Row Name 18 1400             Subjective Comments    Subjective Comments Pt reports he is thinking about having surgery on his right knee before moving to [place] now. He states his f/u with Dr. Lynn is this week, Wed.   -CP         Subjective Pain    Able to rate subjective pain? yes  -CP      Pre-Treatment Pain Level 2  -CP      Post-Treatment Pain Level 2  -CP         Total Minutes    26940 - PT Therapeutic Exercise Minutes 38  -CP         Exercise 1    Exercise Name 1 Level 0 LE bike  -CP      Time 1 5  -CP          Exercise 2    Exercise Name 2 TG squats  -CP      Reps 2 20  -CP         Exercise 3    Exercise Name 3 Quad sets, glute sets   -CP      Reps 3 15x   -CP         Exercise 4    Exercise Name 4 Bridges with hip abd iso  -CP      Reps 4 12x   -CP      Additional Comments BTB  -CP         Exercise 5    Exercise Name 5 Standing SLR 3 way hip  -CP      Reps 5 8x each  -CP         Exercise 6    Exercise Name 6 gastroc stretch, hamstring stretch, soleus stretch  -CP      Reps 6 4x each  -CP         Exercise 7    Exercise Name 7 hip abd clams  -CP      Cueing 7 Verbal  -CP      Reps 7 10x each from sidelying position  -CP      Additional Comments BTB above knee  -CP         Exercise 8    Exercise Name 8 LAQ/hamstring curl with BTB  -CP      Reps 8 10x each   -CP        User Key  (r) = Recorded By, (t) = Taken By, (c) = Cosigned By    Initials Name Provider Type    CP Perkins, Corinne E, PT Physical Therapist                                            Time Calculation:   Start Time: 1436  Total Timed Code Minutes- PT: 38 minute(s)  Therapy Suggested Charges     Code   Minutes Charges    45701 (CPT®) Hc Pt Neuromusc Re Education Ea 15 Min      46895 (CPT®) Hc Pt Ther Proc Ea 15 Min 38 3    23495 (CPT®) Hc Gait Training Ea 15 Min      92577 (CPT®) Hc Pt Therapeutic Act Ea 15 Min      17224 (CPT®) Hc Pt Manual Therapy Ea 15 Min      94453 (CPT®) Hc Pt Ther Massage- Per 15 Min      88913 (CPT®) Hc Pt Iontophoresis Ea 15 Min      90952 (CPT®) Hc Pt Elec Stim Ea-Per 15 Min      48566 (CPT®) Hc Pt Ultrasound Ea 15 Min      17167 (CPT®) Hc Pt Self Care/Mgmt/Train Ea 15 Min      23560 (CPT®) Hc Pt Prosthetic (S) Train Initial Encounter, Each 15 Min      10927 (CPT®) Hc Orthotic(S) Mgmt/Train Initial Encounter, Each 15min      21789 (CPT®) Hc Pt Aquatic Therapy Ea 15 Min      28089 (CPT®) Hc Pt Orthotic(S)/Prosthetic(S) Encounter, Each 15 Min      Total  38 3        Therapy Charges for Today     Code Description Service Date  Service Provider Modifiers Qty    48406605129 HC PT THER PROC EA 15 MIN 8/27/2018 Perkins, Corinne E, PT GP 3                    Corinne E. Perkins, PT  8/27/2018

## 2018-08-29 ENCOUNTER — APPOINTMENT (OUTPATIENT)
Dept: PHYSICAL THERAPY | Facility: HOSPITAL | Age: 73
End: 2018-08-29

## 2018-08-29 ENCOUNTER — OFFICE VISIT (OUTPATIENT)
Dept: ORTHOPEDIC SURGERY | Facility: CLINIC | Age: 73
End: 2018-08-29

## 2018-08-29 VITALS — BODY MASS INDEX: 26.71 KG/M2 | HEIGHT: 66 IN | OXYGEN SATURATION: 98 % | HEART RATE: 52 BPM | WEIGHT: 166.23 LBS

## 2018-08-29 DIAGNOSIS — M17.11 PRIMARY OSTEOARTHRITIS OF RIGHT KNEE: Primary | ICD-10-CM

## 2018-08-29 PROCEDURE — 99213 OFFICE O/P EST LOW 20 MIN: CPT | Performed by: ORTHOPAEDIC SURGERY

## 2018-08-29 RX ORDER — TRAZODONE HYDROCHLORIDE 50 MG/1
TABLET ORAL
COMMUNITY
Start: 2018-08-22

## 2018-08-29 RX ORDER — HYDROCHLOROTHIAZIDE 25 MG/1
TABLET ORAL DAILY
COMMUNITY
Start: 2018-08-22

## 2018-08-29 NOTE — PROGRESS NOTES
[facility] Orthopaedic Surgery Clinic Note    Subjective     Chief Complaint   Patient presents with   • Right Knee - Follow-up     2 weeks        HPI    Hayden Farah is a 72 y.o. male.  He follows up today for his right knee.  He continues to have pain in the knee, moderate to severe, worsening over the past 3 months.  The pain is aching and sharp, associated with giving way, and worsens with standing and climbing stairs, and after he is up for a while walking.      Patient Active Problem List   Diagnosis   • Hypokalemia   • SBO (small bowel obstruction)   • HTN (hypertension)   • H/O prostate cancer     Past Medical History:   Diagnosis Date   • Cancer (CMS/HCC)     prostate cancer   • Celiac disease    • Headache    • Hypertension    • Pneumonia     Pt reports he was 32, in hospital for weeks.       Past Surgical History:   Procedure Laterality Date   • CHOLECYSTECTOMY     • PROSTATECTOMY     • SHOULDER SURGERY Left 20 years ago      History reviewed. No pertinent family history.  Social History     Social History   • Marital status:      Spouse name: N/A   • Number of children: N/A   • Years of education: N/A     Occupational History   • Not on file.     Social History Main Topics   • Smoking status: Never Smoker   • Smokeless tobacco: Never Used   • Alcohol use No   • Drug use: No   • Sexual activity: Not on file     Other Topics Concern   • Not on file     Social History Narrative   • No narrative on file      Current Outpatient Prescriptions on File Prior to Visit   Medication Sig Dispense Refill   • atenolol (TENORMIN) 100 MG tablet Take  by mouth Every Night. Unsure of dose      • buPROPion (WELLBUTRIN) 75 MG tablet Take  by mouth Every Night. Unsure of dose      • busPIRone (BUSPAR) 5 MG tablet Take 1 tablet by mouth Every 12 (Twelve) Hours. 60 tablet 0   • lisinopril (PRINIVIL,ZESTRIL) 10 MG tablet Take  by mouth Every Night. Unsure of dose      • loratadine-pseudoephedrine (CLARITIN-D 12-hour)  5-120 MG per 12 hr tablet Take  by mouth.     • pantoprazole (PROTONIX) 20 MG EC tablet Take  by mouth Daily. Unsure of dose     • [DISCONTINUED] hydrochlorothiazide (MICROZIDE) 12.5 MG capsule Take  by mouth Daily. Unsure of dose       No current facility-administered medications on file prior to visit.       Allergies   Allergen Reactions   • Sulfa Antibiotics         Review of Systems   Constitutional: Positive for fatigue. Negative for activity change, appetite change, chills, diaphoresis, fever and unexpected weight change.   HENT: Positive for sore throat. Negative for congestion, dental problem, drooling, ear discharge, ear pain, facial swelling, hearing loss, mouth sores, nosebleeds, postnasal drip, rhinorrhea, sinus pressure, sneezing, tinnitus, trouble swallowing and voice change.    Eyes: Negative for photophobia, pain, discharge, redness, itching and visual disturbance.   Respiratory: Positive for shortness of breath. Negative for apnea, cough, choking, chest tightness, wheezing and stridor.    Cardiovascular: Negative for chest pain, palpitations and leg swelling.   Gastrointestinal: Negative for abdominal distention, abdominal pain, anal bleeding, blood in stool, constipation, diarrhea, nausea, rectal pain and vomiting.   Endocrine: Negative for cold intolerance, heat intolerance, polydipsia, polyphagia and polyuria.   Genitourinary: Negative for decreased urine volume, difficulty urinating, dysuria, enuresis, flank pain, frequency, genital sores, hematuria and urgency.   Musculoskeletal: Positive for arthralgias. Negative for back pain, gait problem, joint swelling, myalgias, neck pain and neck stiffness.   Skin: Negative for color change, pallor, rash and wound.   Allergic/Immunologic: Positive for environmental allergies. Negative for food allergies and immunocompromised state.   Neurological: Negative for dizziness, tremors, seizures, syncope, facial asymmetry, speech difficulty, weakness,  "light-headedness, numbness and headaches.   Hematological: Negative for adenopathy. Does not bruise/bleed easily.   Psychiatric/Behavioral: Negative for agitation, behavioral problems, confusion, decreased concentration, dysphoric mood, hallucinations, self-injury, sleep disturbance and suicidal ideas. The patient is not nervous/anxious and is not hyperactive.         Objective      Physical Exam  Pulse 52   Ht 167 cm (65.75\")   Wt 75.4 kg (166 lb 3.6 oz)   SpO2 98%   BMI 27.04 kg/m²     Body mass index is 27.04 kg/m².    General:   Mental Status:  Alert   Appearance: Cooperative, in no acute distress   Build and Nutrition: Well-nourished and well developed male   Orientation: Alert and oriented to person, place and time   Posture: Normal   Gait: Normal    Integument:   Right knee: No skin lesions, no rash, no ecchymosis    Lower Extremities:   Right Knee:    Tenderness:  None    Effusion:  Trace    Swelling: None    Crepitus:  Positive    Range of motion:  Extension: 0°       Flexion: 125°  Instability:  No varus laxity, no valgus laxity, negative anterior drawer  Deformities:  None        Imaging/Studies      Imaging Results (last 24 hours)     Procedure Component Value Units Date/Time    XR Knee 4+ View Right [[id number]] Resulted:  08/29/18 1112     Updated:  08/29/18 1114    Narrative:       Right Knee Radiographs  Indication: right knee pain  Views: Standing AP's and skiers of both knees, with lateral and sunrise   views of the right knee    Comparison: 6/13/2018    Findings:   Medial joint space narrowing, with worsening compared to previous films,   with near bone-on-bone contact in the medial compartment, with   patellofemoral spurring.      Impression: Right knee arthritis          Assessment and Plan     Hayden was seen today for follow-up.    Diagnoses and all orders for this visit:    Primary osteoarthritis of right knee  -     XR Knee 4+ View Right  -     Large Joint Arthrocentesis        I reviewed " my findings with patient today.  His radiographs do show some advancement of the arthritis compared to previous films from just a couple of months ago, but symptoms have remained relatively stable.  He is able to function with the brace on.  He was considering knee replacement surgery, but at this point symptoms seem to be moderate and controlled all with conservative treatment.  Wherever, he may benefit from a Visco supplementation injections.  We will submit for approval, and I will see him back once they are ready for demonstration.  Long-term, he may be a candidate for knee replacement surgery.  I did discuss with him that I cannot predict at what point that may be his best option.    Return in about 3 months (around 11/29/2018).      Medical Decision Making  Management Options : prescription/IM medicine  Data/Risk: radiology tests and independent visualization of imaging, lab tests, or EMG/NCV      Dallas Lynn MD  08/29/18  11:27 AM

## 2018-08-31 ENCOUNTER — HOSPITAL ENCOUNTER (OUTPATIENT)
Dept: PHYSICAL THERAPY | Facility: HOSPITAL | Age: 73
Setting detail: THERAPIES SERIES
Discharge: HOME | End: 2018-08-31

## 2018-08-31 DIAGNOSIS — M17.11 PRIMARY OSTEOARTHRITIS OF RIGHT KNEE: Primary | ICD-10-CM

## 2018-08-31 PROCEDURE — 97110 THERAPEUTIC EXERCISES: CPT | Performed by: PHYSICAL THERAPIST

## 2018-09-07 ENCOUNTER — CLINICAL SUPPORT (OUTPATIENT)
Dept: ORTHOPEDIC SURGERY | Facility: CLINIC | Age: 73
End: 2018-09-07

## 2018-09-07 DIAGNOSIS — M17.11 PRIMARY OSTEOARTHRITIS OF RIGHT KNEE: Primary | ICD-10-CM

## 2018-09-07 PROCEDURE — 20610 DRAIN/INJ JOINT/BURSA W/O US: CPT | Performed by: PHYSICIAN ASSISTANT

## 2018-09-07 NOTE — PROGRESS NOTES
CC: Follow-up right knee arthritis, 1/3 Orthovisc injection today    History of present illness: Patient presents for his first Orthovisc injection to the right knee today.  At this time he denies any numbness or tingling into the distal extremity.  No fever, chills, night sweats or other constitutional symptoms.    See chart for PMH, PSH, Meds, All - reviewed.    Ortho exam:  Right knee  Skin is intact without redness, warmth or swelling/effusion.  No lesions or evidence of infection noted.  Tenderness: Mid medial joint line tenderness.  Motor/sensory: Grossly intact L2-S1.    Assessment/plan:  Right knee osteoarthritis    Proceed today with 1/3 of Orthovisc injection.  Patient will follow-up in week for second injection.      After discussing risks of injection the patient gave consent to proceed.  His right knee confirmed as the correct joint to be injected with a timeout.  The knee was then prepped with Hibiclens and injected with a prefilled syringe of Orthovisc without any resistance using anterior lateral approach, patient in seated position.  The patient tolerated procedure well.  Hemostasis was achieved and a Band-Aid was applied over the injection site.  I instructed the patient on signs and symptoms of infection.  They should report to the ER if any of these develop.  Recommended modifying activity to include rest, ice, elevation and/or heat along with oral pain medication as needed.  Patient observed ambulating normally after the injection.

## 2018-09-07 NOTE — PROGRESS NOTES
Procedure   Large Joint Arthrocentesis  Date/Time: 9/7/2018 8:34 AM  Consent given by: patient  Site marked: site marked  Timeout: Immediately prior to procedure a time out was called to verify the correct patient, procedure, equipment, support staff and site/side marked as required   Supporting Documentation  Indications: pain   Procedure Details  Location: knee - R knee  Preparation: Patient was prepped and draped in the usual sterile fashion  Needle size: 22 G  Approach: anterolateral  Medications administered: 30 mg Hyaluronan 30 MG/2ML  Patient tolerance: patient tolerated the procedure well with no immediate complications

## 2018-09-12 ENCOUNTER — HOSPITAL ENCOUNTER (OUTPATIENT)
Dept: PHYSICAL THERAPY | Facility: HOSPITAL | Age: 73
Setting detail: THERAPIES SERIES
Discharge: HOME | End: 2018-09-12

## 2018-09-12 DIAGNOSIS — M17.11 PRIMARY OSTEOARTHRITIS OF RIGHT KNEE: Primary | ICD-10-CM

## 2018-09-12 PROCEDURE — 97110 THERAPEUTIC EXERCISES: CPT | Performed by: PHYSICAL THERAPIST

## 2018-09-12 NOTE — THERAPY TREATMENT NOTE
Outpatient Physical Therapy Ortho Treatment Note  [facility]     Patient Name: Hayden Farah  : 1945  MRN: [id number]  Today's Date: 2018      Visit Date: 2018    Visit Dx:    ICD-10-CM ICD-9-CM   1. Primary osteoarthritis of right knee M17.11 715.16       Patient Active Problem List   Diagnosis   • Hypokalemia   • SBO (small bowel obstruction)   • HTN (hypertension)   • H/O prostate cancer        Past Medical History:   Diagnosis Date   • Cancer (CMS/HCC)     prostate cancer   • Celiac disease    • Headache    • Hypertension    • Pneumonia     Pt reports he was 32, in hospital for weeks.         Past Surgical History:   Procedure Laterality Date   • CHOLECYSTECTOMY     • PROSTATECTOMY     • SHOULDER SURGERY Left 20 years ago                             PT Assessment/Plan     Row Name 18 1000          PT Assessment    Assessment Comments Pt had first injection last Friday, states no change of pain. Held progression of HEP or ther ex in clinic today. Pt denies having to use knee brace in the past week, he is scheduled for second injection Friday.   -CP        PT Plan    PT Plan Comments Reassessment next visit.   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 He states knee pain is about the same.   -CP         Subjective Pain    Able to rate subjective pain? yes  -CP      Pre-Treatment Pain Level 2  -CP         Total Minutes    91689 - PT Therapeutic Exercise Minutes 34  -CP         Exercise 1    Exercise Name 1 LE bike Level 4  -CP      Time 1 6  -CP         Exercise 2    Exercise Name 2 TG squats  -CP      Time 2 2  -CP         Exercise 3    Exercise Name 3 Functional standing quad/glute sets with mod  tandem stance holds level and unlevel surface, EO/EC both  -CP      Reps 3 2x each on green foam  -CP      Additional Comments EO only today  -CP         Exercise 4    Exercise Name 4 stair training  -CP      Sets 4 2  -CP      Reps 4 13  -CP      Additional Comments 1 handrail LUE for improved descending with RLE  -CP         Exercise 5    Exercise Name 5 Standing SLR 3 way hip  -CP      Cueing 5 Verbal;Demo  -CP      Reps 5 12x each  -CP         Exercise 6    Exercise Name 6 gastroc stretch, hamstring stretch, soleus stretch  -CP      Reps 6 4x each  -CP         Exercise 7    Exercise Name 7 Side stepping with knee ext, side step with mod squat  -CP      Cueing 7 Verbal;Demo  -CP      Reps 7 2 x 40 ft  -CP      Additional Comments BTB above knees  -CP         Exercise 8    Exercise Name 8 LAQ/hamstring curl with BTB  -CP      Reps 8 12x each  -CP      Additional Comments BTB  -CP        User Key  (r) = Recorded By, (t) = Taken By, (c) = Cosigned By    Initials Name Provider Type    CP Perkins, Corinne E, PT Physical Therapist                                            Time Calculation:   Start Time: 0938  Total Timed Code Minutes- PT: 34 minute(s)  Therapy Suggested Charges     Code   Minutes Charges    27779 (CPT®) Hc Pt Neuromusc Re Education Ea 15 Min      91311 (CPT®) Hc Pt Ther Proc Ea 15 Min 34 2    34282 (CPT®) Hc Gait Training Ea 15 Min      48109 (CPT®) Hc Pt Therapeutic Act Ea 15 Min      67161 (CPT®) Hc Pt Manual Therapy Ea 15 Min      36529 (CPT®) Hc Pt Ther Massage- Per 15 Min      78243 (CPT®) Hc Pt Iontophoresis Ea 15 Min      99222 (CPT®) Hc Pt Elec Stim Ea-Per 15 Min      90584 (CPT®) Hc Pt Ultrasound Ea 15 Min      38423 (CPT®) Hc Pt Self Care/Mgmt/Train Ea 15 Min      60463 (CPT®) Hc Pt Prosthetic (S) Train Initial Encounter, Each 15 Min      46976 (CPT®) Hc Orthotic(S) Mgmt/Train Initial Encounter, Each 15min      29894 (CPT®) Hc Pt Aquatic Therapy Ea 15 Min      91950 (CPT®) Hc Pt  Orthotic(S)/Prosthetic(S) Encounter, Each 15 Min      Total  34 2        Therapy Charges for Today     Code Description Service Date Service Provider Modifiers Qty    84145798786 HC PT THER PROC EA 15 MIN 9/12/2018 Perkins, Corinne E, PT GP 2                    Corinne E. Perkins, PT  9/12/2018

## 2018-09-14 ENCOUNTER — CLINICAL SUPPORT (OUTPATIENT)
Dept: ORTHOPEDIC SURGERY | Facility: CLINIC | Age: 73
End: 2018-09-14

## 2018-09-14 VITALS — HEIGHT: 66 IN | BODY MASS INDEX: 26.71 KG/M2 | WEIGHT: 166.23 LBS

## 2018-09-14 DIAGNOSIS — M17.11 PRIMARY OSTEOARTHRITIS OF RIGHT KNEE: Primary | ICD-10-CM

## 2018-09-14 PROCEDURE — 20610 DRAIN/INJ JOINT/BURSA W/O US: CPT | Performed by: PHYSICIAN ASSISTANT

## 2018-09-19 ENCOUNTER — HOSPITAL ENCOUNTER (OUTPATIENT)
Dept: PHYSICAL THERAPY | Facility: HOSPITAL | Age: 73
Setting detail: THERAPIES SERIES
Discharge: HOME | End: 2018-09-19

## 2018-09-19 DIAGNOSIS — M17.11 PRIMARY OSTEOARTHRITIS OF RIGHT KNEE: Primary | ICD-10-CM

## 2018-09-19 PROCEDURE — G8978 MOBILITY CURRENT STATUS: HCPCS | Performed by: PHYSICAL THERAPIST

## 2018-09-19 PROCEDURE — G8979 MOBILITY GOAL STATUS: HCPCS | Performed by: PHYSICAL THERAPIST

## 2018-09-19 PROCEDURE — 97110 THERAPEUTIC EXERCISES: CPT | Performed by: PHYSICAL THERAPIST

## 2018-09-19 NOTE — THERAPY PROGRESS REPORT/RE-CERT
Outpatient Physical Therapy Ortho Re-Assessment   [facility]     Patient Name: Hayden Farah  : 1945  MRN: [id number]  Today's Date: 2018      Visit Date: 2018    Patient Active Problem List   Diagnosis   • Hypokalemia   • SBO (small bowel obstruction)   • HTN (hypertension)   • H/O prostate cancer        Past Medical History:   Diagnosis Date   • Cancer (CMS/HCC)     prostate cancer   • Celiac disease    • Headache    • Hypertension    • Pneumonia     Pt reports he was 32, in hospital for weeks.         Past Surgical History:   Procedure Laterality Date   • CHOLECYSTECTOMY     • PROSTATECTOMY     • SHOULDER SURGERY Left 20 years ago       Visit Dx:     ICD-10-CM ICD-9-CM   1. Primary osteoarthritis of right knee M17.11 715.16                 PT Ortho     Row Name 18 1000       Subjective Pain    Pre-Treatment Pain Level 0  -CP       Knee Palpation    Knee Palpation? No Tenderness/Abnormality   denies pain with palpation; mild swelling noted medial joint  -CP       Knee Special Tests    Posterior drawer (PCL lesion) Negative  -CP    Valgus stress (MCL lesion) Negative  -CP    Varus stress (LCL lesion) Negative  -CP    Thessaly test (meniscal lesion) Negative  -CP    Everette’s test (meniscal lesion) Negative  -CP       Right Lower Ext    Rt Knee Extension/Flexion AROM 0-124   pain end range flexion   -CP       Left Lower Ext    Lt Knee Extension/Flexion AROM 0-131  -CP       Left Hip (Manual Muscle Testing)    Left Hip Manual Muscle Testing (MMT) flexion;abduction;adduction  -CP    MMT: Flexion, Left Hip flexion  -CP    MMT, Gross Movement: Left Hip Flexion (5/5) normal  -CP    MMT: ABduction, Left Hip abduction  -CP    MMT, Gross Movement: Left Hip ABduction (4+/5) good plus  -CP    MMT, Gross Movement: Left Hip ADduction (5/5) normal  -CP       Right Hip (Manual Muscle Testing)    Right Hip Manual Muscle Testing (MMT) flexion;abduction;adduction  -CP    MMT: Flexion, Right Hip  flexion  -CP    MMT, Gross Movement: Right Hip Flexion (5/5) normal  -CP    MMT: ABduction, Right Hip abduction  -CP    MMT, Gross Movement: Right Hip ABduction (5/5) normal  -CP    MMT, Gross Movement: Right Hip ADduction (5/5) normal  -CP       Left Knee (Manual Muscle Testing)    Left Knee Manual Muscle Testing (MMT) extension;flexion  -CP    MMT: Extension, Left Knee extension  -CP    MMT, Gross Movement: Left Knee Extension (5/5) normal  -CP    MMT: Flexion, Left Knee flexion  -CP    MMT, Gross Movement: Left Knee Flexion (5/5) normal  -CP       Right Knee (Manual Muscle Testing)    Right Knee Manual Muscle Testing (MMT) extension;flexion  -CP    MMT: Extension, Right Knee extension  -CP    MMT, Gross Movement: Right Knee Extension (4+/5) good plus  -CP    MMT: Flexion, Right Knee flexion  -CP    MMT, Gross Movement: Right Knee Flexion (5/5) normal  -CP       Left Ankle/Foot (Manual Muscle Testing)    Left Ankle Manual Muscle Testing (MMT) dorsiflexion  -CP    MMT: Dorsiflexion Left Ankle Muscles dorsiflexion  -CP    MMT, Gross Movement: Left Ankle Dorsiflexion (5/5) normal  -CP       Right Ankle/Foot (Manual Muscle Testing)    Right Ankle Manual Muscle Testing (MMT) dorsiflexion  -CP    MMT: Dorsiflexion, Right Ankle Muscles dorsiflexion  -CP    MMT, Gross Movement: Right Ankle Dorsiflexion (5/5) normal  -CP       Sensation    Light Touch No apparent deficits  -CP       Lower Extremity Flexibility    Hamstrings Left:;Mildly limited;Right:;WNL  -CP       Gait/Stairs Assessment/Training    Number of Steps (Stairs) 13  -CP    Ascending Technique (Stairs) step-over-step  -CP    Descending Technique (Stairs) step-over-step  -CP      User Key  (r) = Recorded By, (t) = Taken By, (c) = Cosigned By    Initials Name Provider Type    CP Perkins, Corinne E, PT Physical Therapist                                  PT OP Goals     Row Name 09/19/18 1000          PT Short Term Goals    STG Date to Achieve 10/03/18  -CP     STG  1 Patient will demonstrate an independent HEP for core and knee strength and flexibility/ROM.  -CP     STG 1 Progress Met  -CP     STG 2 Patient will increase knee PROM to improve functional mobility to 120-125 degrees flexion.  -CP     STG 2 Progress Met  -CP     STG 3 Patient will be instructed in home exercise program for improved functional mobility and stabilization of the spine.  -CP     STG 3 Progress Met  -CP        Long Term Goals    LTG Date to Achieve 10/19/18  -CP     LTG 1 Patient will demonstrate improved functional outcome measure by 15 points  -CP     LTG 1 Progress Met  -CP     LTG 2 Patient will demonstrate independent HEP progressed for closed chain strength, proprioception, balance and agility with minimal or no compensations or exacerbations  -CP     LTG 2 Progress Ongoing  -CP     LTG 3 Patient will report decreased pain rating on VAS by at least 50% with recreational activities, like performing yardwork.  -CP     LTG 3 Progress Met  -CP     LTG 4 Pt will descend stairs with 1 handrail for support, demonstrate reciprocal gait pattern, for improved stair mobility.  -CP     LTG 4 Progress Met  -CP        Time Calculation    PT Goal Re-Cert Due Date 11/18/18  -CP       User Key  (r) = Recorded By, (t) = Taken By, (c) = Cosigned By    Initials Name Provider Type    CP Perkins, Corinne E, PT Physical Therapist                PT Assessment/Plan     Row Name 09/19/18 1000          PT Assessment    Functional Limitations Limitation in home management;Limitations in community activities;Performance in leisure activities  -CP     Impairments Endurance;Gait;Impaired flexibility;Pain;Muscle strength;Joint mobility  -CP     Assessment Comments Pt demonstrated improved R knee AROM and is progressing with improved functional strengthening. He verbalized compliance with HEP and denies pain while in clinic. Able to perform stair climbing with reciprocal gait pain free.   -CP     Please refer to paper survey for  additional self-reported information Yes  -CP     Rehab Potential Good  -CP     Patient/caregiver participated in establishment of treatment plan and goals Yes  -CP     Patient would benefit from skilled therapy intervention Yes  -CP        PT Plan    PT Frequency 1x/week  -CP     Predicted Duration of Therapy Intervention (Therapy Eval) 3-4 visits  -CP     Planned CPT's? PT EVAL LOW COMPLEXITY: 21701;PT RE-EVAL: 82190;PT THER PROC EA 15 MIN: 65203;PT MANUAL THERAPY EA 15 MIN: 78468;PT NEUROMUSC RE-EDUCATION EA 15 MIN: 85351;PT GAIT TRAINING EA 15 MIN: 91327;PT HOT OR COLD PACK TREAT MCARE;PT ELECTRICAL STIM UNATTEND:   -CP     PT Plan Comments Increased emphasis on HEP to conserve total visits. Will progress HEP next visit. Pt has 3rd injection this Friday.   -CP       User Key  (r) = Recorded By, (t) = Taken By, (c) = Cosigned By    Initials Name Provider Type    CP Perkins, Corinne E, PT Physical Therapist                  Exercises     Row Name 09/19/18 1000             Subjective Comments    Subjective Comments Pt states he had to run over the weekend, chasing his dog, states mild increase in knee pain afterwards.   -CP         Subjective Pain    Able to rate subjective pain? yes  -CP      Pre-Treatment Pain Level 0  -CP      Post-Treatment Pain Level 0  -CP         Total Minutes    01255 - PT Therapeutic Exercise Minutes 38  -CP         Exercise 1    Exercise Name 1 Reassessment completed this date in clinic.   -CP         Exercise 2    Exercise Name 2 step ups  -CP      Sets 2 2  -CP      Reps 2 13  -CP      Additional Comments without UE assist  -CP         Exercise 6    Exercise Name 6 gastroc stretch, hamstring stretch, soleus stretch  -CP      Reps 6 2x each  -CP         Exercise 8    Exercise Name 8 LAQ/hamstring curl with BTB  -CP      Reps 8 15x each  -CP      Additional Comments BTB  -CP        User Key  (r) = Recorded By, (t) = Taken By, (c) = Cosigned By    Initials Name Provider Type    CP  Perkins, Corinne E, PT Physical Therapist                        Outcome Measure Options: Lower Extremity Functional Scale (LEFS)  Lower Extremity Functional Index  Any of your usual work, housework or school activities: No difficulty  Your usual hobbies, recreational or sporting activities: No difficulty  Getting into or out of the bath: No difficulty  Walking between rooms: No difficulty  Putting on your shoes or socks: No difficulty  Squatting: A little bit of difficulty  Lifting an object, like a bag of groceries from the floor: No difficulty  Performing light activities around your home: No difficulty  Performing heavy activities around your home: No difficulty  Getting into or out of a car: A little bit of difficulty  Walking 2 blocks: No difficulty  Walking a mile: No difficulty  Going up or down 10 stairs (about 1 flight of stairs): A little bit of difficulty  Standing for 1 hour: A little bit of difficulty  Sitting for 1 hour: No difficulty  Running on even ground: A little bit of difficulty  Running on uneven ground: A little bit of difficulty  Making sharp turns while running fast: Moderate difficulty  Hopping: Moderate difficulty  Rolling over in bed: No difficulty  Total: 70      Time Calculation:     Therapy Suggested Charges     Code   Minutes Charges    29038 (CPT®) Hc Pt Neuromusc Re Education Ea 15 Min      28551 (CPT®) Hc Pt Ther Proc Ea 15 Min 38 3    81737 (CPT®) Hc Gait Training Ea 15 Min      12940 (CPT®) Hc Pt Therapeutic Act Ea 15 Min      64839 (CPT®) Hc Pt Manual Therapy Ea 15 Min      60437 (CPT®) Hc Pt Ther Massage- Per 15 Min      77912 (CPT®) Hc Pt Iontophoresis Ea 15 Min      36574 (CPT®) Hc Pt Elec Stim Ea-Per 15 Min      66876 (CPT®) Hc Pt Ultrasound Ea 15 Min      03444 (CPT®) Hc Pt Self Care/Mgmt/Train Ea 15 Min      64256 (CPT®) Hc Pt Prosthetic (S) Train Initial Encounter, Each 15 Min      66368 (CPT®) Hc Orthotic(S) Mgmt/Train Initial Encounter, Each 15min      89422 (CPT®) Hc  Pt Aquatic Therapy Ea 15 Min      14595 (CPT®) Hc Pt Orthotic(S)/Prosthetic(S) Encounter, Each 15 Min      Total  38 3          Start Time: 1015  Total Timed Code Minutes- PT: 38 minute(s)     Therapy Charges for Today     Code Description Service Date Service Provider Modifiers Qty    26797163152 HC PT MOBILITY CURRENT 9/19/2018 Perkins, Corinne E, PT GP, CI 1    81645679354 HC PT MOBILITY PROJECTED 9/19/2018 Perkins, Corinne E, PT GP, CJ 1    57402606038 HC PT THER PROC EA 15 MIN 9/19/2018 Perkins, Corinne E, PT GP 3          PT G-Codes  PT Professional Judgement Used?: Yes  Outcome Measure Options: Lower Extremity Functional Scale (LEFS)  Total: 70  Functional Limitation: Mobility: Walking and moving around  Mobility: Walking and Moving Around Current Status (): At least 1 percent but less than 20 percent impaired, limited or restricted  Mobility: Walking and Moving Around Goal Status (): At least 20 percent but less than 40 percent impaired, limited or restricted         Corinne E. Perkins, PT  9/19/2018

## 2018-09-21 ENCOUNTER — HOSPITAL ENCOUNTER (OUTPATIENT)
Dept: PHYSICAL THERAPY | Facility: HOSPITAL | Age: 73
Setting detail: THERAPIES SERIES
Discharge: HOME | End: 2018-09-21

## 2018-09-21 ENCOUNTER — CLINICAL SUPPORT (OUTPATIENT)
Dept: ORTHOPEDIC SURGERY | Facility: CLINIC | Age: 73
End: 2018-09-21

## 2018-09-21 DIAGNOSIS — M17.11 PRIMARY OSTEOARTHRITIS OF RIGHT KNEE: Primary | ICD-10-CM

## 2018-09-21 PROCEDURE — 20610 DRAIN/INJ JOINT/BURSA W/O US: CPT | Performed by: PHYSICIAN ASSISTANT

## 2018-09-21 PROCEDURE — 97110 THERAPEUTIC EXERCISES: CPT | Performed by: PHYSICAL THERAPIST

## 2018-09-21 NOTE — THERAPY TREATMENT NOTE
Outpatient Physical Therapy Ortho Treatment Note  [facility]     Patient Name: Hayden Farah  : 1945  MRN: [id number]  Today's Date: 2018      Visit Date: 2018    Visit Dx:    ICD-10-CM ICD-9-CM   1. Primary osteoarthritis of right knee M17.11 715.16       Patient Active Problem List   Diagnosis   • Hypokalemia   • SBO (small bowel obstruction)   • HTN (hypertension)   • H/O prostate cancer        Past Medical History:   Diagnosis Date   • Cancer (CMS/HCC)     prostate cancer   • Celiac disease    • Headache    • Hypertension    • Pneumonia     Pt reports he was 32, in hospital for weeks.         Past Surgical History:   Procedure Laterality Date   • CHOLECYSTECTOMY     • PROSTATECTOMY     • SHOULDER SURGERY Left 20 years ago               PT Assessment/Plan     Row Name 18 0700          PT Assessment    Assessment Comments Pt verbalized minimal knee pain on arrival to clinic despite increased activity. He continues to be limited by gatroc and hamstring tightness RLE. Increased emphasis placed on performing stretching HEP.   -CP        PT Plan    PT Plan Comments F/u with patient 1x next week, assess response from 3rd injection.   -CP       User Key  (r) = Recorded By, (t) = Taken By, (c) = Cosigned By    Initials Name Provider Type    CP Perkins, Corinne E, PT Physical Therapist                    Exercises     Row Name 18 0700             Subjective Comments    Subjective Comments Pt states he mowed a few yards yesterday, reports mild soreness/stiffness on arrival to clinic. He reports he hasn't been doing any exercises at home but has increased walking and activity without wearing brace.  -CP         Subjective Pain    Able to rate subjective pain? yes  -CP      Pre-Treatment Pain Level 1  -CP      Post-Treatment Pain Level 1  -CP         Total Minutes    50732 - PT Therapeutic Exercise Minutes 39  -CP         Exercise 1    Exercise Name 1 LE bike Level 3  -CP      Time 1 5   -CP         Exercise 2    Exercise Name 2 step ups: forward, lateral with 2 risers  -CP      Reps 2 13  -CP         Exercise 3    Exercise Name 3 Tandem stance holds on balance beam EO only  -CP      Reps 3 2x ea  -CP      Time 3 30 sec holds  -CP      Additional Comments intermittent UE support as needed  -CP         Exercise 4    Exercise Name 4 standing heel raises, toe raises  -CP      Sets 4 --  -CP      Reps 4 15x   -CP         Exercise 5    Exercise Name 5 Standing SLR 3 way hip  -CP      Reps 5 12x each  -CP         Exercise 6    Exercise Name 6 gastroc stretch, hamstring stretch, soleus stretch  -CP      Reps 6 2x each  -CP         Exercise 7    Exercise Name 7 Side stepping with knee ext, side step with mod squat  -CP      Reps 7 4 x 30 ft   -CP      Additional Comments BTB above knees  -CP         Exercise 8    Exercise Name 8 LAQ/hamstring curl with BTB  -CP      Reps 8 15x each  -CP      Additional Comments BTB  -CP         Exercise 9    Exercise Name 9 modified forward lunge  -CP      Reps 9 8x each  -CP      Additional Comments intermittent UE support  -CP        User Key  (r) = Recorded By, (t) = Taken By, (c) = Cosigned By    Initials Name Provider Type    CP Perkins, Corinne E, PT Physical Therapist                                            Time Calculation:   Start Time: 0732  Total Timed Code Minutes- PT: 39 minute(s)  Therapy Suggested Charges     Code   Minutes Charges    57759 (CPT®) Hc Pt Neuromusc Re Education Ea 15 Min      92820 (CPT®) Hc Pt Ther Proc Ea 15 Min 39 3    43214 (CPT®) Hc Gait Training Ea 15 Min      65298 (CPT®) Hc Pt Therapeutic Act Ea 15 Min      82754 (CPT®) Hc Pt Manual Therapy Ea 15 Min      40129 (CPT®) Hc Pt Ther Massage- Per 15 Min      19960 (CPT®) Hc Pt Iontophoresis Ea 15 Min      57921 (CPT®) Hc Pt Elec Stim Ea-Per 15 Min      36079 (CPT®) Hc Pt Ultrasound Ea 15 Min      73127 (CPT®) Hc Pt Self Care/Mgmt/Train Ea 15 Min      33506 (CPT®) Hc Pt Prosthetic (S)  Train Initial Encounter, Each 15 Min      59272 (CPT®) Hc Orthotic(S) Mgmt/Train Initial Encounter, Each 15min      40657 (CPT®) Hc Pt Aquatic Therapy Ea 15 Min      24160 (CPT®) Hc Pt Orthotic(S)/Prosthetic(S) Encounter, Each 15 Min      Total  39 3        Therapy Charges for Today     Code Description Service Date Service Provider Modifiers Qty    91285593602 HC PT THER PROC EA 15 MIN 9/21/2018 Perkins, Corinne E, PT GP 3                    Corinne E. Perkins, PT  9/21/2018

## 2018-09-21 NOTE — PROGRESS NOTES
CC: Follow-up right knee arthritis, 3/3 Orthovisc injection today     History of present illness: Patient presents for his third Orthovisc injection to the right knee today.  At this time he denies any numbness or tingling into the distal extremity.  No fever, chills, night sweats or other constitutional symptoms.  Feels pain has improved since last injection.     See chart for PMH, PSH, Meds, All - reviewed.     Ortho exam:  Right knee  Skin is intact without redness, warmth or swelling/effusion.  No lesions or evidence of infection noted.  Tenderness: Mid medial joint line tenderness.  Motor/sensory: Grossly intact L2-S1.     Assessment/plan:  Right knee osteoarthritis     Proceed today with 3/3 of Orthovisc injection.  Patient will follow-up as needed.  He already has an appointment to follow back up with Dr. Lynn in February if he is still in [place].  He is due to move to [place] in 3 weeks.       After discussing risks of injection the patient gave consent to proceed.  His right knee confirmed as the correct joint to be injected with a timeout.  The knee was then prepped with Hibiclens and injected with a prefilled syringe of Orthovisc without any resistance using anterior lateral approach, patient in seated position.  The patient tolerated procedure well.  Hemostasis was achieved and a Band-Aid was applied over the injection site.  I instructed the patient on signs and symptoms of infection.  They should report to the ER if any of these develop.  Recommended modifying activity to include rest, ice, elevation and/or heat along with oral pain medication as needed.  Patient observed ambulating normally after the injection.

## 2018-09-21 NOTE — PROGRESS NOTES
Procedure   Large Joint Arthrocentesis  Date/Time: 9/21/2018 11:02 AM  Consent given by: patient  Site marked: site marked  Timeout: Immediately prior to procedure a time out was called to verify the correct patient, procedure, equipment, support staff and site/side marked as required   Supporting Documentation  Indications: pain   Procedure Details  Location: knee - R knee  Preparation: Patient was prepped and draped in the usual sterile fashion  Needle size: 22 G  Approach: anterolateral  Medications administered: 30 mg Hyaluronan 30 MG/2ML  Patient tolerance: patient tolerated the procedure well with no immediate complications

## 2018-09-28 ENCOUNTER — HOSPITAL ENCOUNTER (OUTPATIENT)
Dept: PHYSICAL THERAPY | Facility: HOSPITAL | Age: 73
Setting detail: THERAPIES SERIES
Discharge: HOME | End: 2018-09-28

## 2018-09-28 DIAGNOSIS — M17.11 PRIMARY OSTEOARTHRITIS OF RIGHT KNEE: Primary | ICD-10-CM

## 2018-09-28 PROCEDURE — 97110 THERAPEUTIC EXERCISES: CPT | Performed by: PHYSICAL THERAPIST

## 2018-09-28 NOTE — THERAPY TREATMENT NOTE
Outpatient Physical Therapy Ortho Treatment Note  [facility]     Patient Name: Hayden Farah  : 1945  MRN: [id number]  Today's Date: 2018      Visit Date: 2018    Visit Dx:    ICD-10-CM ICD-9-CM   1. Primary osteoarthritis of right knee M17.11 715.16       Patient Active Problem List   Diagnosis   • Hypokalemia   • SBO (small bowel obstruction)   • HTN (hypertension)   • H/O prostate cancer        Past Medical History:   Diagnosis Date   • Cancer (CMS/HCC)     prostate cancer   • Celiac disease    • Headache    • Hypertension    • Pneumonia     Pt reports he was 32, in hospital for weeks.         Past Surgical History:   Procedure Laterality Date   • CHOLECYSTECTOMY     • PROSTATECTOMY     • SHOULDER SURGERY Left 20 years ago                             PT Assessment/Plan     Row Name 18          PT Assessment    Assessment Comments Pt verbalized increase activity since last visit, mild relief of right knee pain after third injection, and noncompliance with HEP. Pt continues to demonstrate limited quad and hamstring flexibility along with weakness of eccentric quad control.   -CP        PT Plan    PT Plan Comments Progress HEP next visit if appropriate. Anticipate d/c in next 2 visits prior to patient moving out of state.   -CP       User Key  (r) = Recorded By, (t) = Taken By, (c) = Cosigned By    Initials Name Provider Type    CP Perkins, Corinne E, PT Physical Therapist                    Exercises     Row Name 18 09             Subjective Comments    Subjective Comments Pt states he recently travelled, had to climb multiple stairs with noted pain driving yesterday.   -CP         Subjective Pain    Able to rate subjective pain? yes  -CP      Pre-Treatment Pain Level 1  -CP      Post-Treatment Pain Level 1  -CP         Total Minutes    67881 - PT Therapeutic Exercise Minutes 40  -CP         Exercise 1    Exercise Name 1 LE bike Level 4  -CP      Time 1 6  -CP          Exercise 2    Exercise Name 2 forward step up  -CP      Sets 2 2  -CP      Reps 2 13  -CP      Additional Comments reciprocal gait  -CP         Exercise 4    Exercise Name 4 quad, hamstring, gastroc stretch  -CP      Reps 4 3x each  -CP         Exercise 5    Exercise Name 5 Standing SLR 3 way hip  -CP      Reps 5 12x each  -CP         Exercise 6    Exercise Name 6 gastroc stretch, hamstring stretch, soleus stretch  -CP      Reps 6 2x each  -CP         Exercise 7    Exercise Name 7 Side stepping, zigzag stepping  -CP      Reps 7 4 x 30 ft   -CP      Additional Comments BTB above knees  -CP         Exercise 8    Exercise Name 8 LAQ/hamstring curl with BTB  -CP      Reps 8 15x each  -CP         Exercise 9    Exercise Name 9 modified forward lunge  -CP      Reps 9 10x  -CP      Additional Comments intermittent UE support  -CP        User Key  (r) = Recorded By, (t) = Taken By, (c) = Cosigned By    Initials Name Provider Type    CP Perkins, Corinne E, PT Physical Therapist                                            Time Calculation:   Start Time: 0947  Total Timed Code Minutes- PT: 40 minute(s)  Therapy Suggested Charges     Code   Minutes Charges    91337 (CPT®) Hc Pt Neuromusc Re Education Ea 15 Min      84286 (CPT®) Hc Pt Ther Proc Ea 15 Min 40 3    33328 (CPT®) Hc Gait Training Ea 15 Min      77718 (CPT®) Hc Pt Therapeutic Act Ea 15 Min      61123 (CPT®) Hc Pt Manual Therapy Ea 15 Min      57900 (CPT®) Hc Pt Ther Massage- Per 15 Min      90498 (CPT®) Hc Pt Iontophoresis Ea 15 Min      74136 (CPT®) Hc Pt Elec Stim Ea-Per 15 Min      45391 (CPT®) Hc Pt Ultrasound Ea 15 Min      67550 (CPT®) Hc Pt Self Care/Mgmt/Train Ea 15 Min      57518 (CPT®) Hc Pt Prosthetic (S) Train Initial Encounter, Each 15 Min      71577 (CPT®) Hc Orthotic(S) Mgmt/Train Initial Encounter, Each 15min      69040 (CPT®) Hc Pt Aquatic Therapy Ea 15 Min      58243 (CPT®) Hc Pt Orthotic(S)/Prosthetic(S) Encounter, Each 15 Min      Total  40 3         Therapy Charges for Today     Code Description Service Date Service Provider Modifiers Qty    13723630025 HC PT THER PROC EA 15 MIN 9/28/2018 Perkins, Corinne E, PT GP 3                    Corinne E. Perkins, PT  9/28/2018

## 2018-10-03 ENCOUNTER — HOSPITAL ENCOUNTER (OUTPATIENT)
Dept: PHYSICAL THERAPY | Facility: HOSPITAL | Age: 73
Setting detail: THERAPIES SERIES
Discharge: HOME | End: 2018-10-03

## 2018-10-03 DIAGNOSIS — M17.11 PRIMARY OSTEOARTHRITIS OF RIGHT KNEE: Primary | ICD-10-CM

## 2018-10-03 PROCEDURE — 97110 THERAPEUTIC EXERCISES: CPT | Performed by: PHYSICAL THERAPIST

## 2018-10-03 NOTE — THERAPY TREATMENT NOTE
Outpatient Physical Therapy Ortho Treatment Note   [facility]     Patient Name: Hayden Farah  : 1945  MRN: [id number]  Today's Date: 10/3/2018      Visit Date: 10/03/2018    Visit Dx:    ICD-10-CM ICD-9-CM   1. Primary osteoarthritis of right knee M17.11 715.16       Patient Active Problem List   Diagnosis   • Hypokalemia   • SBO (small bowel obstruction) (CMS/HCC)   • HTN (hypertension)   • H/O prostate cancer        Past Medical History:   Diagnosis Date   • Cancer (CMS/HCC)     prostate cancer   • Celiac disease    • Headache    • Hypertension    • Pneumonia     Pt reports he was 32, in hospital for weeks.         Past Surgical History:   Procedure Laterality Date   • CHOLECYSTECTOMY     • PROSTATECTOMY     • SHOULDER SURGERY Left 20 years ago                             PT Assessment/Plan     Row Name 10/03/18 0900          PT Assessment    Assessment Comments Patient is progressing with functional quad strengthening, verbalized compliance with stretching HEP.   -CP        PT Plan    PT Plan Comments Review progression of HEP and POC next visit. Anticipate d/c next visit.   -CP       User Key  (r) = Recorded By, (t) = Taken By, (c) = Cosigned By    Initials Name Provider Type    CP Perkins, Corinne E, PT Physical Therapist                    Exercises     Row Name 10/03/18 0900             Subjective Comments    Subjective Comments Pt reports he has been active, not noticed as much knee pain, except after long day of activity or with kneeling position.   -CP         Subjective Pain    Able to rate subjective pain? yes  -CP      Pre-Treatment Pain Level 0  -CP      Post-Treatment Pain Level 0  -CP         Total Minutes    11979 - PT Therapeutic Exercise Minutes 38  -CP         Exercise 1    Exercise Name 1 LE bike Level 4  -CP      Time 1 6  -CP         Exercise 2    Exercise Name 2 TG squats   -CP      Time 2 2 minutes  -CP      Additional Comments followed by toe raises 15x   -CP          Exercise 3    Exercise Name 3 SL sit-stands from airex height  -CP      Reps 3 8x each  -CP         Exercise 4    Exercise Name 4 quad, hamstring, gastroc stretch  -CP      Reps 4 3x each  -CP         Exercise 5    Exercise Name 5 Standing SLR 3 way hip  -CP      Reps 5 12x each  -CP      Additional Comments 2 lb ankle weights  -CP         Exercise 6    Exercise Name 6 gastroc stretch, hamstring stretch, soleus stretch  -CP      Reps 6 2x each  -CP         Exercise 7    Exercise Name 7 Side stepping, zigzag stepping  -CP      Reps 7 4 x 30 ft   -CP      Additional Comments wearing 2lb ankle weights, GTB above knees  -CP         Exercise 8    Exercise Name 8 stool scoots for hamstring   -CP      Reps 8 2x 30 ft each   -CP         Exercise 9    Exercise Name 9 modified forward lunge  -CP      Cueing 9 Verbal  -CP      Reps 9 12x  -CP      Additional Comments wearing 2lb ankle weights  -CP         Exercise 10    Exercise Name 10 Tall kneeling into half kneeling  -CP      Reps 10 5x with RLE, unable to tolerate full weight bearing RLE only   -CP        User Key  (r) = Recorded By, (t) = Taken By, (c) = Cosigned By    Initials Name Provider Type    CP Perkins, Corinne E, PT Physical Therapist                                            Time Calculation:   Start Time: 0930  Total Timed Code Minutes- PT: 38 minute(s)  Therapy Suggested Charges     Code   Minutes Charges    17090 (CPT®) Hc Pt Neuromusc Re Education Ea 15 Min      24388 (CPT®) Hc Pt Ther Proc Ea 15 Min 38 3    56793 (CPT®) Hc Gait Training Ea 15 Min      19126 (CPT®) Hc Pt Therapeutic Act Ea 15 Min      97294 (CPT®) Hc Pt Manual Therapy Ea 15 Min      80363 (CPT®) Hc Pt Ther Massage- Per 15 Min      65103 (CPT®) Hc Pt Iontophoresis Ea 15 Min      55398 (CPT®) Hc Pt Elec Stim Ea-Per 15 Min      53652 (CPT®) Hc Pt Ultrasound Ea 15 Min      96743 (CPT®) Hc Pt Self Care/Mgmt/Train Ea 15 Min      43796 (CPT®) Hc Pt Prosthetic (S) Train Initial Encounter, Each 15  Min      09973 (CPT®) Hc Orthotic(S) Mgmt/Train Initial Encounter, Each 15min      59147 (CPT®) Hc Pt Aquatic Therapy Ea 15 Min      93821 (CPT®) Hc Pt Orthotic(S)/Prosthetic(S) Encounter, Each 15 Min       (CPT®) Hc Pt Electrical Stim Unattended      Total  38 3        Therapy Charges for Today     Code Description Service Date Service Provider Modifiers Qty    69287156424 HC PT THER PROC EA 15 MIN 10/3/2018 Perkins, Corinne E, PT GP 3                    Corinne E. Perkins, PT  10/3/2018

## 2018-10-05 ENCOUNTER — HOSPITAL ENCOUNTER (OUTPATIENT)
Dept: PHYSICAL THERAPY | Facility: HOSPITAL | Age: 73
Setting detail: THERAPIES SERIES
Discharge: HOME | End: 2018-10-05

## 2018-10-05 DIAGNOSIS — M17.11 PRIMARY OSTEOARTHRITIS OF RIGHT KNEE: Primary | ICD-10-CM

## 2018-10-05 PROCEDURE — 97110 THERAPEUTIC EXERCISES: CPT | Performed by: PHYSICAL THERAPIST

## 2018-10-05 PROCEDURE — G8980 MOBILITY D/C STATUS: HCPCS | Performed by: PHYSICAL THERAPIST

## 2018-10-05 PROCEDURE — G8979 MOBILITY GOAL STATUS: HCPCS | Performed by: PHYSICAL THERAPIST

## 2018-10-05 NOTE — THERAPY TREATMENT NOTE
"    Outpatient Physical Therapy Ortho Treatment Note  [facility]     Patient Name: Hayden Farah  : 1945  MRN: [id number]  Today's Date: 10/5/2018      Visit Date: 10/05/2018    Visit Dx:    ICD-10-CM ICD-9-CM   1. Primary osteoarthritis of right knee M17.11 715.16       Patient Active Problem List   Diagnosis   • Hypokalemia   • SBO (small bowel obstruction) (CMS/HCC)   • HTN (hypertension)   • H/O prostate cancer        Past Medical History:   Diagnosis Date   • Cancer (CMS/HCC)     prostate cancer   • Celiac disease    • Headache    • Hypertension    • Pneumonia     Pt reports he was 32, in hospital for weeks.         Past Surgical History:   Procedure Laterality Date   • CHOLECYSTECTOMY     • PROSTATECTOMY     • SHOULDER SURGERY Left 20 years ago                             PT Assessment/Plan     Row Name 10/05/18 1000          PT Assessment    Assessment Comments Pt has met his functional goals, continues to have right knee pain; however is moving out of state. He is indep with current HEP, educated on progressed HEP with balance training per pt request.   -CP        PT Plan    PT Plan Comments Pt will be d/c to home with HEP due to meeting goals and moving out of state.   -CP       User Key  (r) = Recorded By, (t) = Taken By, (c) = Cosigned By    Initials Name Provider Type    CP Perkins, Corinne E, PT Physical Therapist                    Exercises     Row Name 10/05/18 0900             Subjective Comments    Subjective Comments Pt states he has been more active with upcoming move ahead, states increased right hip and right knee \"soreness\" rated 3/10 on arrival.   -CP         Subjective Pain    Able to rate subjective pain? yes  -CP      Pre-Treatment Pain Level 3  -CP      Post-Treatment Pain Level 3  -CP         Total Minutes    02919 - PT Therapeutic Exercise Minutes 33  -CP         Exercise 1    Exercise Name 1 LE bike Level 4  -CP      Time 1 6  -CP         Exercise 2    Exercise Name 2 TG " squats   -CP      Time 2 2 minutes  -CP         Exercise 4    Exercise Name 4 quad, hamstring, gastroc stretch  -CP      Reps 4 3x each  -CP         Exercise 5    Exercise Name 5 Standing SLR 3 way hip  -CP      Reps 5 12x each  -CP         Exercise 6    Exercise Name 6 gastroc stretch, hamstring stretch, soleus stretch  -CP      Reps 6 2x each  -CP         Exercise 7    Exercise Name 7 Side stepping, zigzag stepping  -CP      Reps 7 4 x 30 ft   -CP      Additional Comments BTB  -CP         Exercise 8    Exercise Name 8 NMRE: varied foot positions with EO/EC narrow ANDREW, mod tandem, tandem, and SLS each  -CP      Reps 8 2x   -CP      Time 8 30 seconds  -CP      Additional Comments able to hold SLS 19 seconds each leg.   -CP        User Key  (r) = Recorded By, (t) = Taken By, (c) = Cosigned By    Initials Name Provider Type    CP Perkins, Corinne E, PT Physical Therapist                               PT OP Goals     Row Name 10/05/18 0945          PT Short Term Goals    STG Date to Achieve 10/03/18  -CP     STG 1 Patient will demonstrate an independent HEP for core and knee strength and flexibility/ROM.  -CP     STG 1 Progress Met  -CP     STG 2 Patient will increase knee PROM to improve functional mobility to 120-125 degrees flexion.  -CP     STG 2 Progress Met  -CP     STG 3 Patient will be instructed in home exercise program for improved functional mobility and stabilization of the spine.  -CP     STG 3 Progress Met  -CP        Long Term Goals    LTG Date to Achieve 10/19/18  -CP     LTG 1 Patient will demonstrate improved functional outcome measure by 15 points  -CP     LTG 1 Progress Met  -CP     LTG 2 Patient will demonstrate independent HEP progressed for closed chain strength, proprioception, balance and agility with minimal or no compensations or exacerbations  -CP     LTG 2 Progress Met  -CP     LTG 3 Patient will report decreased pain rating on VAS by at least 50% with recreational activities, like  performing yardwork.  -CP     LTG 3 Progress Met  -CP     LTG 4 Pt will descend stairs with 1 handrail for support, demonstrate reciprocal gait pattern, for improved stair mobility.  -CP     LTG 4 Progress Met  -CP       User Key  (r) = Recorded By, (t) = Taken By, (c) = Cosigned By    Initials Name Provider Type    CP Perkins, Corinne E, PT Physical Therapist          Therapy Education  Given: HEP, Symptoms/condition management  Program: Progressed  How Provided: Verbal, Demonstration, Written  Provided to: Patient  Level of Understanding: Verbalized              Time Calculation:   Start Time: 0946  Total Timed Code Minutes- PT: 33 minute(s)  Therapy Suggested Charges     Code   Minutes Charges    80997 (CPT®) Hc Pt Neuromusc Re Education Ea 15 Min      29716 (CPT®) Hc Pt Ther Proc Ea 15 Min 33 2    59088 (CPT®) Hc Gait Training Ea 15 Min      84322 (CPT®) Hc Pt Therapeutic Act Ea 15 Min      20535 (CPT®) Hc Pt Manual Therapy Ea 15 Min      29392 (CPT®) Hc Pt Ther Massage- Per 15 Min      75027 (CPT®) Hc Pt Iontophoresis Ea 15 Min      76930 (CPT®) Hc Pt Elec Stim Ea-Per 15 Min      10578 (CPT®) Hc Pt Ultrasound Ea 15 Min      32246 (CPT®) Hc Pt Self Care/Mgmt/Train Ea 15 Min      15826 (CPT®) Hc Pt Prosthetic (S) Train Initial Encounter, Each 15 Min      46620 (CPT®) Hc Orthotic(S) Mgmt/Train Initial Encounter, Each 15min      84784 (CPT®) Hc Pt Aquatic Therapy Ea 15 Min      54809 (CPT®) Hc Pt Orthotic(S)/Prosthetic(S) Encounter, Each 15 Min       (CPT®) Hc Pt Electrical Stim Unattended      Total  33 2        Therapy Charges for Today     Code Description Service Date Service Provider Modifiers Qty    18363591281 HC PT THER PROC EA 15 MIN 10/5/2018 Perkins, Corinne E, PT GP 2    98534946169 HC PT MOBILITY PROJECTED 10/5/2018 Perkins, Corinne E, PT GP, CI 1    75624642680 HC PT MOBILITY DISCHARGE 10/5/2018 Perkins, Corinne E, PT GP, CI 1          PT G-Codes  PT Professional Judgement Used?: Yes  Functional  Limitation: Mobility: Walking and moving around  Mobility: Walking and Moving Around Goal Status (): At least 1 percent but less than 20 percent impaired, limited or restricted  Mobility: Walking and Moving Around Discharge Status (): At least 1 percent but less than 20 percent impaired, limited or restricted         Corinne E. Perkins, PT  10/5/2018

## 2018-10-05 NOTE — THERAPY DISCHARGE NOTE
"     Outpatient Physical Therapy Ortho Treatment Note/Discharge Summary  [facility]     Patient Name: Hayden Farah  : 1945  MRN: [id number]  Today's Date: 10/5/2018      Visit Date: 10/05/2018    Visit Dx:    ICD-10-CM ICD-9-CM   1. Primary osteoarthritis of right knee M17.11 715.16       Patient Active Problem List   Diagnosis   • Hypokalemia   • SBO (small bowel obstruction) (CMS/HCC)   • HTN (hypertension)   • H/O prostate cancer        Past Medical History:   Diagnosis Date   • Cancer (CMS/HCC)     prostate cancer   • Celiac disease    • Headache    • Hypertension    • Pneumonia     Pt reports he was 32, in hospital for weeks.         Past Surgical History:   Procedure Laterality Date   • CHOLECYSTECTOMY     • PROSTATECTOMY     • SHOULDER SURGERY Left 20 years ago                             PT Assessment/Plan     Row Name 10/05/18 1000          PT Assessment    Assessment Comments Pt has met his functional goals, continues to have right knee pain; however is moving out of state. He is indep with current HEP, educated on progressed HEP with balance training per pt request.   -CP        PT Plan    PT Plan Comments Pt will be d/c to home with HEP due to meeting goals and moving out of state.   -CP       User Key  (r) = Recorded By, (t) = Taken By, (c) = Cosigned By    Initials Name Provider Type    CP Perkins, Corinne E, PT Physical Therapist                    Exercises     Row Name 10/05/18 0900             Subjective Comments    Subjective Comments Pt states he has been more active with upcoming move ahead, states increased right hip and right knee \"soreness\" rated 3/10 on arrival.   -CP         Subjective Pain    Able to rate subjective pain? yes  -CP      Pre-Treatment Pain Level 3  -CP      Post-Treatment Pain Level 3  -CP         Total Minutes    52250 - PT Therapeutic Exercise Minutes 33  -CP         Exercise 1    Exercise Name 1 LE bike Level 4  -CP      Time 1 6  -CP         Exercise 2    " Exercise Name 2 TG squats   -CP      Time 2 2 minutes  -CP         Exercise 4    Exercise Name 4 quad, hamstring, gastroc stretch  -CP      Reps 4 3x each  -CP         Exercise 5    Exercise Name 5 Standing SLR 3 way hip  -CP      Reps 5 12x each  -CP         Exercise 6    Exercise Name 6 gastroc stretch, hamstring stretch, soleus stretch  -CP      Reps 6 2x each  -CP         Exercise 7    Exercise Name 7 Side stepping, zigzag stepping  -CP      Reps 7 4 x 30 ft   -CP      Additional Comments BTB  -CP         Exercise 8    Exercise Name 8 NMRE: varied foot positions with EO/EC narrow ANDREW, mod tandem, tandem, and SLS each  -CP      Reps 8 2x   -CP      Time 8 30 seconds  -CP      Additional Comments able to hold SLS 19 seconds each leg.   -CP        User Key  (r) = Recorded By, (t) = Taken By, (c) = Cosigned By    Initials Name Provider Type    CP Perkins, Corinne E, PT Physical Therapist                               PT OP Goals     Row Name 10/05/18 0945          PT Short Term Goals    STG Date to Achieve 10/03/18  -CP     STG 1 Patient will demonstrate an independent HEP for core and knee strength and flexibility/ROM.  -CP     STG 1 Progress Met  -CP     STG 2 Patient will increase knee PROM to improve functional mobility to 120-125 degrees flexion.  -CP     STG 2 Progress Met  -CP     STG 3 Patient will be instructed in home exercise program for improved functional mobility and stabilization of the spine.  -CP     STG 3 Progress Met  -CP        Long Term Goals    LTG Date to Achieve 10/19/18  -CP     LTG 1 Patient will demonstrate improved functional outcome measure by 15 points  -CP     LTG 1 Progress Met  -CP     LTG 2 Patient will demonstrate independent HEP progressed for closed chain strength, proprioception, balance and agility with minimal or no compensations or exacerbations  -CP     LTG 2 Progress Met  -CP     LTG 3 Patient will report decreased pain rating on VAS by at least 50% with recreational  activities, like performing yardwork.  -CP     LTG 3 Progress Met  -CP     LTG 4 Pt will descend stairs with 1 handrail for support, demonstrate reciprocal gait pattern, for improved stair mobility.  -CP     LTG 4 Progress Met  -CP       User Key  (r) = Recorded By, (t) = Taken By, (c) = Cosigned By    Initials Name Provider Type    CP Perkins, Corinne E, PT Physical Therapist          Therapy Education  Given: HEP, Symptoms/condition management  Program: Progressed  How Provided: Verbal, Demonstration, Written  Provided to: Patient  Level of Understanding: Verbalized              Time Calculation:   Start Time: 0946  Total Timed Code Minutes- PT: 33 minute(s)  Therapy Suggested Charges     Code   Minutes Charges    19399 (CPT®) Hc Pt Neuromusc Re Education Ea 15 Min      63864 (CPT®) Hc Pt Ther Proc Ea 15 Min 33 2    32901 (CPT®) Hc Gait Training Ea 15 Min      99812 (CPT®) Hc Pt Therapeutic Act Ea 15 Min      45170 (CPT®) Hc Pt Manual Therapy Ea 15 Min      20902 (CPT®) Hc Pt Ther Massage- Per 15 Min      09883 (CPT®) Hc Pt Iontophoresis Ea 15 Min      50827 (CPT®) Hc Pt Elec Stim Ea-Per 15 Min      17547 (CPT®) Hc Pt Ultrasound Ea 15 Min      34572 (CPT®) Hc Pt Self Care/Mgmt/Train Ea 15 Min      02938 (CPT®) Hc Pt Prosthetic (S) Train Initial Encounter, Each 15 Min      22191 (CPT®) Hc Orthotic(S) Mgmt/Train Initial Encounter, Each 15min      16476 (CPT®) Hc Pt Aquatic Therapy Ea 15 Min      05731 (CPT®) Hc Pt Orthotic(S)/Prosthetic(S) Encounter, Each 15 Min       (CPT®) Hc Pt Electrical Stim Unattended      Total  33 2        Therapy Charges for Today     Code Description Service Date Service Provider Modifiers Qty    90704380480 HC PT THER PROC EA 15 MIN 10/5/2018 Perkins, Corinne E, PT GP 2    95528645870 HC PT MOBILITY PROJECTED 10/5/2018 Perkins, Corinne E, PT GP, CI 1    67443614802 HC PT MOBILITY DISCHARGE 10/5/2018 Perkins, Corinne E, PT GP, CI 1          PT G-Codes  PT Professional Judgement Used?:  Yes  Functional Limitation: Mobility: Walking and moving around  Mobility: Walking and Moving Around Goal Status (): At least 1 percent but less than 20 percent impaired, limited or restricted  Mobility: Walking and Moving Around Discharge Status (): At least 1 percent but less than 20 percent impaired, limited or restricted     OP PT Discharge Summary  Date of Discharge: 10/05/18  Reason for Discharge: All goals achieved  Outcomes Achieved: Able to achieve all goals within established timeline  Discharge Destination: Home with home program      Corinne E. Perkins, PT  10/5/2018

## 2018-10-11 ENCOUNTER — DOCUMENTATION (OUTPATIENT)
Dept: PHYSICAL THERAPY | Facility: HOSPITAL | Age: 73
End: 2018-10-11

## 2019-01-09 ENCOUNTER — TELEPHONE (OUTPATIENT)
Dept: INTERNAL MEDICINE | Facility: CLINIC | Age: 74
End: 2019-01-09

## 2019-06-11 RX ORDER — TRAZODONE HYDROCHLORIDE 50 MG/1
TABLET ORAL
Qty: 30 TABLET | Refills: 1 | OUTPATIENT
Start: 2019-06-11

## 2019-06-20 ENCOUNTER — TELEPHONE (OUTPATIENT)
Dept: INTERNAL MEDICINE | Facility: CLINIC | Age: 74
End: 2019-06-20

## 2019-06-20 NOTE — TELEPHONE ENCOUNTER
Rec'd fax from [facility] in [place], [place]. PT transferred care due to moving out of state. Rx denied and faxed back

## 2019-08-12 RX ORDER — ATENOLOL 25 MG/1
TABLET ORAL
Qty: 45 TABLET | Refills: 0 | OUTPATIENT
Start: 2019-08-12

## 2019-12-30 NOTE — PROGRESS NOTES
CC: Follow-up right knee arthritis, 2/3 Orthovisc injection today     History of present illness: Patient presents for his second Orthovisc injection to the right knee today.  At this time he denies any numbness or tingling into the distal extremity.  No fever, chills, night sweats or other constitutional symptoms.  Patient reports he's able to do all activities that he needs to do but he noted no significant difference in his pain level after receiving the first injection.     See chart for PMH, PSH, Meds, All - reviewed.     Ortho exam:  Right knee  Skin is intact without redness, warmth or swelling/effusion.  No lesions or evidence of infection noted.  Tenderness: Mid medial joint line tenderness.  Motor/sensory: Grossly intact L2-S1.     Assessment/plan:  Right knee osteoarthritis     Proceed today with 2/3 of Orthovisc injection.  Patient will follow-up in week for third injection.       After discussing risks of injection the patient gave consent to proceed.  His right knee confirmed as the correct joint to be injected with a timeout.  The knee was then prepped with Hibiclens and injected with a prefilled syringe of Orthovisc without any resistance using anterior lateral approach, patient in seated position.  The patient tolerated procedure well.  Hemostasis was achieved and a Band-Aid was applied over the injection site.  I instructed the patient on signs and symptoms of infection.  They should report to the ER if any of these develop.  Recommended modifying activity to include rest, ice, elevation and/or heat along with oral pain medication as needed.  Patient observed ambulating normally after the injection.  
Procedure   Large Joint Arthrocentesis  Date/Time: 9/14/2018 8:45 AM  Consent given by: patient  Site marked: site marked  Timeout: Immediately prior to procedure a time out was called to verify the correct patient, procedure, equipment, support staff and site/side marked as required   Supporting Documentation  Indications: pain   Procedure Details  Location: knee -   Preparation: Patient was prepped and draped in the usual sterile fashion  Needle size: 22 G  Approach: anterolateral  Medications administered: 30 mg Hyaluronan 30 MG/2ML  Patient tolerance: patient tolerated the procedure well with no immediate complications           
Nutrition, metabolism, and development symptoms
Nutrition, metabolism, and development symptoms